# Patient Record
Sex: MALE | Race: WHITE | NOT HISPANIC OR LATINO | URBAN - METROPOLITAN AREA
[De-identification: names, ages, dates, MRNs, and addresses within clinical notes are randomized per-mention and may not be internally consistent; named-entity substitution may affect disease eponyms.]

---

## 2017-01-02 DIAGNOSIS — F33.1 MAJOR DEPRESSIVE DISORDER, RECURRENT EPISODE, MODERATE (H): Primary | ICD-10-CM

## 2017-01-03 RX ORDER — ESCITALOPRAM OXALATE 20 MG/1
TABLET ORAL
Qty: 30 TABLET | Refills: 0 | Status: SHIPPED | OUTPATIENT
Start: 2017-01-03 | End: 2017-03-02

## 2017-01-03 RX ORDER — BUPROPION HYDROCHLORIDE 150 MG/1
TABLET ORAL
Qty: 30 TABLET | Refills: 0 | Status: SHIPPED | OUTPATIENT
Start: 2017-01-03 | End: 2017-03-02

## 2017-01-03 NOTE — TELEPHONE ENCOUNTER
Kian he said he would call back to schedule an appointment.  I did let him know that a failure to schedule an appointment may result in no further refills of his/her medication.

## 2017-01-03 NOTE — TELEPHONE ENCOUNTER
Please let the patient know that a 30 day refill has been sent for their prescription.  They are due for a return fasting COMPLETE PHYSICAL within 30 days.  Failure to schedule an appointment may result in no further refills of his/her medication.

## 2017-01-03 NOTE — TELEPHONE ENCOUNTER
Andrey Parks is requesting a refill of:    Pending Prescriptions:                       Disp   Refills    escitalopram (LEXAPRO) 20 MG tablet [Phar*30 tab*0            Sig: TAKE 1 TABLET BY MOUTH DAILY    buPROPion (WELLBUTRIN XL) 150 MG 24 hr ta*30 tab*0            Sig: TAKE 1 TABLET BY MOUTH EVERY MORNING    Pt last seen 11/20/15. Please adivse

## 2017-03-02 ENCOUNTER — OFFICE VISIT (OUTPATIENT)
Dept: FAMILY MEDICINE | Facility: CLINIC | Age: 47
End: 2017-03-02

## 2017-03-02 VITALS
HEART RATE: 58 BPM | TEMPERATURE: 98.2 F | DIASTOLIC BLOOD PRESSURE: 80 MMHG | HEIGHT: 68 IN | BODY MASS INDEX: 31.25 KG/M2 | WEIGHT: 206.2 LBS | SYSTOLIC BLOOD PRESSURE: 122 MMHG | OXYGEN SATURATION: 98 %

## 2017-03-02 DIAGNOSIS — D49.2 SKIN NEOPLASM: ICD-10-CM

## 2017-03-02 DIAGNOSIS — Z87.891 PERSONAL HISTORY OF TOBACCO USE, PRESENTING HAZARDS TO HEALTH: ICD-10-CM

## 2017-03-02 DIAGNOSIS — F33.1 MAJOR DEPRESSIVE DISORDER, RECURRENT EPISODE, MODERATE (H): ICD-10-CM

## 2017-03-02 DIAGNOSIS — G47.33 OSA (OBSTRUCTIVE SLEEP APNEA): ICD-10-CM
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
## 2017-03-02 DIAGNOSIS — Z00.00 ROUTINE HISTORY AND PHYSICAL EXAMINATION OF ADULT: Primary | ICD-10-CM

## 2017-03-02 LAB
% GRANULOCYTES: 43.4 %
HCT VFR BLD AUTO: 47.6 % (ref 40–53)
HEMOGLOBIN: 15.9 G/DL (ref 13.3–17.7)
LYMPHOCYTES NFR BLD AUTO: 47.7 %
MCH RBC QN AUTO: 31.5 PG (ref 26–33)
MCHC RBC AUTO-ENTMCNC: 33.4 G/DL (ref 31–36)
MCV RBC AUTO: 94.4 FL (ref 78–100)
MONOCYTES NFR BLD AUTO: 8.9 %
PLATELET COUNT - QUEST: 210 10^9/L (ref 150–375)
RBC # BLD AUTO: 5.04 10*12/L (ref 4.4–5.9)
WBC # BLD AUTO: 5.3 10*9/L (ref 4–11)

## 2017-03-02 PROCEDURE — 80048 BASIC METABOLIC PNL TOTAL CA: CPT | Mod: 90 | Performed by: PHYSICIAN ASSISTANT

## 2017-03-02 PROCEDURE — 85025 COMPLETE CBC W/AUTO DIFF WBC: CPT | Performed by: PHYSICIAN ASSISTANT

## 2017-03-02 PROCEDURE — 80076 HEPATIC FUNCTION PANEL: CPT | Mod: 90 | Performed by: PHYSICIAN ASSISTANT

## 2017-03-02 PROCEDURE — 99396 PREV VISIT EST AGE 40-64: CPT | Performed by: PHYSICIAN ASSISTANT

## 2017-03-02 PROCEDURE — 36415 COLL VENOUS BLD VENIPUNCTURE: CPT | Performed by: PHYSICIAN ASSISTANT

## 2017-03-02 PROCEDURE — 80061 LIPID PANEL: CPT | Mod: 90 | Performed by: PHYSICIAN ASSISTANT

## 2017-03-02 RX ORDER — ESCITALOPRAM OXALATE 20 MG/1
20 TABLET ORAL DAILY
Qty: 90 TABLET | Refills: 3 | Status: SHIPPED | OUTPATIENT
Start: 2017-03-02 | End: 2018-06-28

## 2017-03-02 RX ORDER — BUPROPION HYDROCHLORIDE 150 MG/1
150 TABLET ORAL EVERY MORNING
Qty: 90 TABLET | Refills: 3 | Status: SHIPPED | OUTPATIENT
Start: 2017-03-02 | End: 2018-06-28

## 2017-03-02 ASSESSMENT — PATIENT HEALTH QUESTIONNAIRE - PHQ9: 5. POOR APPETITE OR OVEREATING: SEVERAL DAYS

## 2017-03-02 ASSESSMENT — ANXIETY QUESTIONNAIRES
1. FEELING NERVOUS, ANXIOUS, OR ON EDGE: SEVERAL DAYS
IF YOU CHECKED OFF ANY PROBLEMS ON THIS QUESTIONNAIRE, HOW DIFFICULT HAVE THESE PROBLEMS MADE IT FOR YOU TO DO YOUR WORK, TAKE CARE OF THINGS AT HOME, OR GET ALONG WITH OTHER PEOPLE: NOT DIFFICULT AT ALL
7. FEELING AFRAID AS IF SOMETHING AWFUL MIGHT HAPPEN: NOT AT ALL
6. BECOMING EASILY ANNOYED OR IRRITABLE: NOT AT ALL
3. WORRYING TOO MUCH ABOUT DIFFERENT THINGS: SEVERAL DAYS
2. NOT BEING ABLE TO STOP OR CONTROL WORRYING: SEVERAL DAYS
GAD7 TOTAL SCORE: 5
5. BEING SO RESTLESS THAT IT IS HARD TO SIT STILL: SEVERAL DAYS

## 2017-03-02 NOTE — PROGRESS NOTES
SUBJECTIVE:     CC: Andrey Parks is an 46 year old male who presents for preventative health visit.     Healthy Habits:    Do you get at least three servings of calcium containing foods daily (dairy, green leafy vegetables, etc.)? yes    Amount of exercise or daily activities, outside of work: Cross Fit    Problems taking medications regularly No    Medication side effects: No    Have you had an eye exam in the past two years? no    Do you see a dentist twice per year? yes    Do you have sleep apnea, excessive snoring or daytime drowsiness?yes    1. Depression well controlled.   Denies AE      Today's PHQ-2 Score: No flowsheet data found.      Social History   Substance Use Topics     Smoking status: Former Smoker     Packs/day: 0.75     Years: 20.00     Quit date: 6/3/2010     Smokeless tobacco: Current User     Types: Chew      Comment: quit chewing     Alcohol use 2.4 oz/week     4 Standard drinks or equivalent per week     The patient does not drink >3 drinks per day nor >7 drinks per week.    Last PSA: No results found for: PSA    Recent Labs   Lab Test  11/20/15   1112  06/03/14   1349   CHOL  210*  183   HDL  52  61   LDL  138*  106   TRIG  100  82   CHOLHDLRATIO  4.0  3.0       Reviewed orders with patient. Reviewed health maintenance and updated orders accordingly - Yes    Reviewed and updated as needed this visit by clinical staff  Tobacco  Allergies  Meds  Problems         Reviewed and updated as needed this visit by Provider  Allergies  Meds  Problems        Past Medical History   Diagnosis Date     Anxiety      Chronic infection      Lymes disease 2011, 1997     Depressive disorder      Lyme disease 2012     hearing loss right ear     Onychomycosis      left toe - treated with PO Lamisil      Past Surgical History   Procedure Laterality Date     Orthopedic surgery       right ankle surgery     Arthroscopy knee       right knee     Tonsillectomy       childhood     Septoplasty, turbinoplasty,  combined  11/2/2011     Procedure:COMBINED SEPTOPLASTY, TURBINOPLASTY; COMBINED SEPTOPLASTY  with Turbinate Reduction ; Surgeon:JACQUELINE KIRAN; Location:[facility] OR     Colonoscopy  2013     normal     Refractive surgery  2010     Lasix       ROS:  C: NEGATIVE for fever, chills, change in weight  I: NEGATIVE for worrisome rashes, moles or lesions  E: NEGATIVE for vision changes or irritation  ENT: NEGATIVE for ear, mouth and throat problems  R: NEGATIVE for significant cough or SOB  CV: NEGATIVE for chest pain, palpitations or peripheral edema  GI: NEGATIVE for nausea, abdominal pain,  or change in bowel habits - did have some GERD last week   male: negative for dysuria, hematuria, decreased urinary stream, erectile dysfunction, urethral discharge  M: NEGATIVE for significant arthralgias or myalgia  N: NEGATIVE for weakness, dizziness or paresthesias  P: NEGATIVE for changes in mood or affect    Problem list, Medication list, Allergies, and Medical/Social/Surgical histories reviewed in [facility] and updated as appropriate.  Labs reviewed in EPIC  BP Readings from Last 3 Encounters:   03/02/17 122/80   11/20/15 114/78   06/03/14 112/70    Wt Readings from Last 3 Encounters:   03/02/17 93.5 kg (206 lb 3.2 oz)   11/20/15 89.8 kg (198 lb)   06/03/14 86.9 kg (191 lb 9.6 oz)                  Patient Active Problem List   Diagnosis     Health Care Home     ACP (advance care planning)     Major depressive disorder, recurrent episode, moderate (H)     DANY (obstructive sleep apnea)     Skin neoplasm - upper left back 3 mm erythematous macule 2017     Past Surgical History   Procedure Laterality Date     Orthopedic surgery       right ankle surgery     Arthroscopy knee       right knee     Tonsillectomy       childhood     Septoplasty, turbinoplasty, combined  11/2/2011     Procedure:COMBINED SEPTOPLASTY, TURBINOPLASTY; COMBINED SEPTOPLASTY  with Turbinate Reduction ; Surgeon:JACQUELINE KIRAN; Location:[facility]     Colonoscopy  2013  "    normal     Refractive surgery  2010     Lasix       Social History   Substance Use Topics     Smoking status: Former Smoker     Packs/day: 0.75     Years: 20.00     Quit date: 6/3/2010     Smokeless tobacco: Current User     Types: Chew      Comment: quit chewing     Alcohol use 2.4 oz/week     4 Standard drinks or equivalent per week     Family History   Problem Relation Age of Onset     CANCER Mother 70     possible colon or ovarian that metastasized     Alcohol/Drug Mother      Depression Mother      Alcohol/Drug Father      Seizure Disorder Father      Depression Brother      MENTAL ILLNESS Brother      ADHD     Depression Brother      Other - See Comments Son      autism spectrum     CANCER Paternal Grandfather      prostate         Current Outpatient Prescriptions   Medication Sig Dispense Refill     escitalopram (LEXAPRO) 20 MG tablet Take 1 tablet (20 mg) by mouth daily 90 tablet 3     buPROPion (WELLBUTRIN XL) 150 MG 24 hr tablet Take 1 tablet (150 mg) by mouth every morning 90 tablet 3     Multiple Vitamin (MULTI-DAY) TABS Take 1 tablet by mouth daily.       FISH OIL Take 2,000 mg by mouth daily.       [DISCONTINUED] escitalopram (LEXAPRO) 20 MG tablet TAKE 1 TABLET BY MOUTH DAILY 30 tablet 0     [DISCONTINUED] buPROPion (WELLBUTRIN XL) 150 MG 24 hr tablet TAKE 1 TABLET BY MOUTH EVERY MORNING 30 tablet 0     Allergies   Allergen Reactions     Aspirin [Dihydroxyaluminum Aminoacetate] Rash     OBJECTIVE:     /80 (BP Location: Right arm, Patient Position: Chair, Cuff Size: Adult Regular)  Pulse 58  Temp 98.2  F (36.8  C) (Oral)  Ht 1.715 m (5' 7.5\")  Wt 93.5 kg (206 lb 3.2 oz)  SpO2 98%  BMI 31.82 kg/m2  EXAM:  GENERAL: healthy, alert and no distress  EYES: Eyes grossly normal to inspection, PERRL and conjunctivae and sclerae normal  HENT: ear canals and TM's normal, nose and mouth without ulcers or lesions  NECK: no adenopathy, no asymmetry, masses, or scars and thyroid normal to palpation  RESP: " lungs clear to auscultation - no rales, rhonchi or wheezes  CV: regular rate and rhythm, normal S1 S2, no S3 or S4, no murmur, click or rub, no peripheral edema and peripheral pulses strong  ABDOMEN: soft, nontender, no hepatosplenomegaly, no masses and bowel sounds normal   (male): normal male genitalia without lesions or urethral discharge, no hernia  MS: no gross musculoskeletal defects noted, no edema  SKIN: upper left back there is a 3 mm erythematous macule.  NEURO: Normal strength and tone, mentation intact and speech normal  PSYCH: mentation appears normal, affect normal/bright    ASSESSMENT/PLAN:     1. Major depressive disorder, recurrent episode, moderate (H)  - escitalopram (LEXAPRO) 20 MG tablet; Take 1 tablet (20 mg) by mouth daily  Dispense: 90 tablet; Refill: 3  - buPROPion (WELLBUTRIN XL) 150 MG 24 hr tablet; Take 1 tablet (150 mg) by mouth every morning  Dispense: 90 tablet; Refill: 3    2. DANY (obstructive sleep apnea)  Continue machine    3. Routine history and physical examination of adult    - Lipid Profile (QUEST)  - BASIC METABOLIC PANEL (QUEST)  - HEMOGRAM/PLATE/DIFF    4. Personal history of tobacco use, presenting hazards to health  Great job quitting chewing tobacco.  Stop smoking cigarettes advised.     5. Skin neoplasm - upper left back 3 mm erythematous macule 2017  Obs for now, may need bx if changing or enlarging    6. Exposure to smoke, fire and flames, sequela  [facility] dept - requests these labs annualy  - HEPATIC PANEL (QUEST)  - BASIC METABOLIC PANEL (QUEST)  - HEMOGRAM/PLATE/DIFF    COUNSELING:  Special attention given to:        Regular exercise       Healthy diet/nutrition       Vision screening    BP Screening:   Last 3 BP Readings:    BP Readings from Last 3 Encounters:   03/02/17 122/80   11/20/15 114/78   06/03/14 112/70       The following was recommended to the patient:  Re-screen BP within a year and recommended lifestyle modifications  BP Readings from Last 6  "Encounters:   03/02/17 122/80   11/20/15 114/78   06/03/14 112/70   07/29/13 118/79          reports that he quit smoking about 6 years ago. He has a 15.00 pack-year smoking history. His smokeless tobacco use includes Chew.    Estimated body mass index is 31.82 kg/(m^2) as calculated from the following:    Height as of this encounter: 1.715 m (5' 7.5\").    Weight as of this encounter: 93.5 kg (206 lb 3.2 oz).   Weight management plan: Discussed healthy diet and exercise guidelines and patient will follow up in 12 months in clinic to re-evaluate.    Counseling Resources:  ATP IV Guidelines  Pooled Cohorts Equation Calculator  FRAX Risk Assessment  ICSI Preventive Guidelines  Dietary Guidelines for Americans, 2010  USDA's MyPlate  ASA Prophylaxis  Lung CA Screening    SOTO Schumacher  [facility]      "

## 2017-03-02 NOTE — MR AVS SNAPSHOT
After Visit Summary   3/2/2017    Andrey Parks    MRN: [id number]           Patient Information     Date Of Birth          1970        Visit Information        Provider Department      3/2/2017 10:30 AM Bethany Monge PA [facility]        Today's Diagnoses     Routine history and physical examination of adult    -  1    Major depressive disorder, recurrent episode, moderate (H)        DANY (obstructive sleep apnea)        Personal history of tobacco use, presenting hazards to health        Skin neoplasm - upper left back 3 mm erythematous macule 2017        Exposure to smoke, fire and flames, sequela           Follow-ups after your visit        Who to contact     If you have questions or need follow up information about today's clinic visit or your schedule please contact [facility] directly at [phone].  Normal or non-critical lab and imaging results will be communicated to you by [email or web address], letter or phone within 4 business days after the clinic has received the results. If you do not hear from us within 7 days, please contact the clinic through [email or web address] or phone. If you have a critical or abnormal lab result, we will notify you by phone as soon as possible.  Submit refill requests through iKONVER[employer] or call your pharmacy and they will forward the refill request to us. Please allow 3 business days for your refill to be completed.          Additional Information About Your Visit        MyChart Information     iKONVERSE gives you secure access to your electronic health record. If you see a primary care provider, you can also send messages to your care team and make appointments. If you have questions, please call your primary care clinic.  If you do not have a primary care provider, please call [phone] and they will assist you.        Care EveryWhere ID     This is your Care EveryWhere ID. This could be used by other organizations to  "access your [facility] medical records  [id number]        Your Vitals Were     Pulse Temperature Height Pulse Oximetry BMI (Body Mass Index)       58 98.2  F (36.8  C) (Oral) 1.715 m (5' 7.5\") 98% 31.82 kg/m2        Blood Pressure from Last 3 Encounters:   03/02/17 122/80   11/20/15 114/78   06/03/14 112/70    Weight from Last 3 Encounters:   03/02/17 93.5 kg (206 lb 3.2 oz)   11/20/15 89.8 kg (198 lb)   06/03/14 86.9 kg (191 lb 9.6 oz)              We Performed the Following     BASIC METABOLIC PANEL (QUEST)     HEMOGRAM/PLATE/DIFF     HEPATIC PANEL (QUEST)     Lipid Profile (QUEST)          Today's Medication Changes          These changes are accurate as of: 3/2/17  2:38 PM.  If you have any questions, ask your nurse or doctor.               These medicines have changed or have updated prescriptions.        Dose/Directions    buPROPion 150 MG 24 hr tablet   Commonly known as:  WELLBUTRIN XL   This may have changed:  See the new instructions.   Used for:  Major depressive disorder, recurrent episode, moderate (H)   Changed by:  Bethany Monge PA        Dose:  150 mg   Take 1 tablet (150 mg) by mouth every morning   Quantity:  90 tablet   Refills:  3       escitalopram 20 MG tablet   Commonly known as:  LEXAPRO   This may have changed:  See the new instructions.   Used for:  Major depressive disorder, recurrent episode, moderate (H)   Changed by:  Bethany Monge PA        Dose:  20 mg   Take 1 tablet (20 mg) by mouth daily   Quantity:  90 tablet   Refills:  3            Where to get your medicines      These medications were sent to [facility] [id number] [place] [address] - [address] AT [facility] [address]  [address], [facility] [address]     Phone:  [phone]     buPROPion 150 MG 24 hr tablet    escitalopram 20 MG tablet                Primary Care Provider Office Phone # Fax #    SOTO Schumacher [phone]       " [facility]  [address]  [facility] [address]        Thank you!     Thank you for choosing [facility]  for your care. Our goal is always to provide you with excellent care. Hearing back from our patients is one way we can continue to improve our services. Please take a few minutes to complete the written survey that you may receive in the mail after your visit with us. Thank you!             Your Updated Medication List - Protect others around you: Learn how to safely use, store and throw away your medicines at www.disposemymeds.org.          This list is accurate as of: 3/2/17  2:38 PM.  Always use your most recent med list.                   Brand Name Dispense Instructions for use    buPROPion 150 MG 24 hr tablet    WELLBUTRIN XL    90 tablet    Take 1 tablet (150 mg) by mouth every morning       escitalopram 20 MG tablet    LEXAPRO    90 tablet    Take 1 tablet (20 mg) by mouth daily       FISH OIL      Take 2,000 mg by mouth daily.       MULTI-DAY Tabs      Take 1 tablet by mouth daily.

## 2017-03-02 NOTE — NURSING NOTE
Andrey Parks is here for physical and is fasting     Pre-Visit Screening :  Immunizations : up to date  Colon Screening : is up to date  Asthma Action Test/Plan : NA  PHQ9/GAD7 :  Done Today

## 2017-03-03 LAB
ALBUMIN SERPL-MCNC: 4.5 G/DL (ref 3.6–5.1)
ALBUMIN/GLOB SERPL: 2.3 (CALC) (ref 1–2.5)
ALP SERPL-CCNC: 65 U/L (ref 40–115)
ALT SERPL-CCNC: 18 U/L (ref 9–46)
AST SERPL-CCNC: 33 U/L (ref 10–40)
BILIRUB SERPL-MCNC: 0.6 MG/DL (ref 0.2–1.2)
BILIRUBIN, DIRECT: 0.1 MG/DL (ref 0–0.5)
BILIRUBIN,INDIRECT: 0.5 MG/DL (CALC) (ref 0.2–1.2)
BUN SERPL-MCNC: 18 MG/DL (ref 7–25)
BUN/CREATININE RATIO: NORMAL (CALC) (ref 6–22)
CALCIUM SERPL-MCNC: 9.5 MG/DL (ref 8.6–10.3)
CHLORIDE SERPLBLD-SCNC: 106 MMOL/L (ref 98–110)
CHOLEST SERPL-MCNC: 183 MG/DL (ref 125–200)
CHOLEST/HDLC SERPL: 3.3 (CALC)
CO2 SERPL-SCNC: 26 MMOL/L (ref 20–31)
CREAT SERPL-MCNC: 1.1 MG/DL (ref 0.6–1.35)
EGFR AFRICAN AMERICAN - QUEST: 94 ML/MIN/1.73M2
GFR SERPL CREATININE-BSD FRML MDRD: 81 ML/MIN/1.73M2
GLOBULIN, CALCULATED - QUEST: 2 G/DL (CALC) (ref 1.9–3.7)
GLUCOSE - QUEST: 95 MG/DL (ref 65–99)
HDLC SERPL-MCNC: 56 MG/DL
LDLC SERPL CALC-MCNC: 106 MG/DL (CALC)
NONHDLC SERPL-MCNC: 127 MG/DL (CALC)
POTASSIUM SERPL-SCNC: 4 MMOL/L (ref 3.5–5.3)
PROT SERPL-MCNC: 6.5 G/DL (ref 6.1–8.1)
SODIUM SERPL-SCNC: 141 MMOL/L (ref 135–146)
TRIGL SERPL-MCNC: 105 MG/DL

## 2017-03-03 ASSESSMENT — ANXIETY QUESTIONNAIRES: GAD7 TOTAL SCORE: 5

## 2017-03-03 ASSESSMENT — PATIENT HEALTH QUESTIONNAIRE - PHQ9: SUM OF ALL RESPONSES TO PHQ QUESTIONS 1-9: 0

## 2018-06-28 DIAGNOSIS — F33.1 MAJOR DEPRESSIVE DISORDER, RECURRENT EPISODE, MODERATE (H): ICD-10-CM

## 2018-06-28 RX ORDER — BUPROPION HYDROCHLORIDE 150 MG/1
TABLET ORAL
Qty: 90 TABLET | Refills: 0 | OUTPATIENT
Start: 2018-06-28

## 2018-06-28 RX ORDER — ESCITALOPRAM OXALATE 20 MG/1
20 TABLET ORAL DAILY
Qty: 30 TABLET | Refills: 0 | COMMUNITY
Start: 2018-06-28 | End: 2018-07-30

## 2018-06-28 RX ORDER — BUPROPION HYDROCHLORIDE 150 MG/1
150 TABLET ORAL EVERY MORNING
Qty: 30 TABLET | Refills: 0 | COMMUNITY
Start: 2018-06-28 | End: 2018-07-30

## 2018-06-28 RX ORDER — ESCITALOPRAM OXALATE 20 MG/1
TABLET ORAL
Qty: 90 TABLET | Refills: 0 | OUTPATIENT
Start: 2018-06-28

## 2018-06-28 NOTE — TELEPHONE ENCOUNTER
Andrey is calling stating he needs a temporary fill of is meds until his appt on 7/17.  I called a 30 day refill of Lexapro and Wellbutrin to [facility] pharmacy to get him though and spoke to Andrey to let him know.

## 2018-06-28 NOTE — TELEPHONE ENCOUNTER
Refused Prescriptions:                       Disp   Refills    buPROPion (WELLBUTRIN XL) 150 MG 24 hr tab*90 tab*0        Sig: TAKE 1 TABLET BY MOUTH EVERY MORNING  Refused By: ISMA MERAZ  Reason for Refusal: Appt required, please call patient    escitalopram (LEXAPRO) 20 MG tablet [Pharm*90 tab*0        Sig: TAKE 1 TABLET BY MOUTH EVERY DAY  Refused By: ISMA MERAZ  Reason for Refusal: Appt required, please call patient    Huey  [phone] (home) [phone] (work)

## 2018-07-30 ENCOUNTER — OFFICE VISIT (OUTPATIENT)
Dept: FAMILY MEDICINE | Facility: CLINIC | Age: 48
End: 2018-07-30

## 2018-07-30 VITALS
HEART RATE: 68 BPM | HEIGHT: 67 IN | OXYGEN SATURATION: 97 % | BODY MASS INDEX: 32.65 KG/M2 | WEIGHT: 208 LBS | TEMPERATURE: 97.9 F | SYSTOLIC BLOOD PRESSURE: 120 MMHG | DIASTOLIC BLOOD PRESSURE: 76 MMHG

## 2018-07-30 DIAGNOSIS — D49.2 SKIN NEOPLASM: ICD-10-CM

## 2018-07-30 DIAGNOSIS — Z87.891 PERSONAL HISTORY OF TOBACCO USE, PRESENTING HAZARDS TO HEALTH: ICD-10-CM

## 2018-07-30 DIAGNOSIS — F33.1 MAJOR DEPRESSIVE DISORDER, RECURRENT EPISODE, MODERATE (H): ICD-10-CM

## 2018-07-30 DIAGNOSIS — E66.9 OBESITY (BMI 30-39.9): ICD-10-CM

## 2018-07-30 DIAGNOSIS — G47.33 OSA (OBSTRUCTIVE SLEEP APNEA): ICD-10-CM

## 2018-07-30 DIAGNOSIS — Z02.89 ENCOUNTER FOR FIREFIGHTER HEALTH EXAMINATION: ICD-10-CM

## 2018-07-30 DIAGNOSIS — Z00.00 ENCOUNTER FOR ROUTINE HISTORY AND PHYSICAL EXAM FOR MALE: Primary | ICD-10-CM

## 2018-07-30 LAB
% GRANULOCYTES: 50.9 %
ALBUMIN (URINE): ABNORMAL MG/DL
APPEARANCE UR: CLEAR
BACTERIA, UR: ABNORMAL
BILIRUB UR QL: ABNORMAL
CASTS/LPF: ABNORMAL
COLOR UR: YELLOW
EP/HPF: ABNORMAL
GLUCOSE URINE: ABNORMAL MG/DL
HCT VFR BLD AUTO: 46.3 % (ref 40–53)
HEMOGLOBIN: 15.8 G/DL (ref 13.3–17.7)
HGB UR QL: ABNORMAL
KETONES UR QL: ABNORMAL MG/DL
LEUKOCYTE ESTERASE - QUEST: ABNORMAL
LYMPHOCYTES NFR BLD AUTO: 38.4 %
MCH RBC QN AUTO: 32.6 PG (ref 26–33)
MCHC RBC AUTO-ENTMCNC: 34.1 G/DL (ref 31–36)
MCV RBC AUTO: 95.6 FL (ref 78–100)
MISC.: ABNORMAL
MONOCYTES NFR BLD AUTO: 10.7 %
NITRITE UR QL STRIP: ABNORMAL
PH UR STRIP: 7.5 PH (ref 5–7)
PLATELET COUNT - QUEST: 218 10^9/L (ref 150–375)
RBC # BLD AUTO: 4.84 10*12/L (ref 4.4–5.9)
RBC, UR MICRO: ABNORMAL (ref ?–2)
SP. GRAVITY: 1.02
UROBILINOGEN UR QL STRIP: 0.2 EU/DL (ref 0.2–1)
WBC # BLD AUTO: 4.6 10*9/L (ref 4–11)
WBC, UR MICRO: ABNORMAL (ref ?–2)

## 2018-07-30 PROCEDURE — 99396 PREV VISIT EST AGE 40-64: CPT | Performed by: PHYSICIAN ASSISTANT

## 2018-07-30 PROCEDURE — 80048 BASIC METABOLIC PNL TOTAL CA: CPT | Mod: 90 | Performed by: PHYSICIAN ASSISTANT

## 2018-07-30 PROCEDURE — 85025 COMPLETE CBC W/AUTO DIFF WBC: CPT | Performed by: PHYSICIAN ASSISTANT

## 2018-07-30 PROCEDURE — 36415 COLL VENOUS BLD VENIPUNCTURE: CPT | Performed by: PHYSICIAN ASSISTANT

## 2018-07-30 PROCEDURE — 93000 ELECTROCARDIOGRAM COMPLETE: CPT | Performed by: PHYSICIAN ASSISTANT

## 2018-07-30 PROCEDURE — 80076 HEPATIC FUNCTION PANEL: CPT | Mod: 90 | Performed by: PHYSICIAN ASSISTANT

## 2018-07-30 PROCEDURE — 80061 LIPID PANEL: CPT | Mod: 90 | Performed by: PHYSICIAN ASSISTANT

## 2018-07-30 PROCEDURE — 81001 URINALYSIS AUTO W/SCOPE: CPT | Performed by: PHYSICIAN ASSISTANT

## 2018-07-30 RX ORDER — ESCITALOPRAM OXALATE 20 MG/1
20 TABLET ORAL DAILY
Qty: 90 TABLET | Refills: 3 | Status: SHIPPED | OUTPATIENT
Start: 2018-07-30 | End: 2019-08-17

## 2018-07-30 RX ORDER — BUPROPION HYDROCHLORIDE 150 MG/1
150 TABLET ORAL EVERY MORNING
Qty: 90 TABLET | Refills: 3 | Status: SHIPPED | OUTPATIENT
Start: 2018-07-30 | End: 2019-08-17

## 2018-07-30 ASSESSMENT — ANXIETY QUESTIONNAIRES
7. FEELING AFRAID AS IF SOMETHING AWFUL MIGHT HAPPEN: NOT AT ALL
3. WORRYING TOO MUCH ABOUT DIFFERENT THINGS: NOT AT ALL
6. BECOMING EASILY ANNOYED OR IRRITABLE: SEVERAL DAYS
5. BEING SO RESTLESS THAT IT IS HARD TO SIT STILL: NOT AT ALL
IF YOU CHECKED OFF ANY PROBLEMS ON THIS QUESTIONNAIRE, HOW DIFFICULT HAVE THESE PROBLEMS MADE IT FOR YOU TO DO YOUR WORK, TAKE CARE OF THINGS AT HOME, OR GET ALONG WITH OTHER PEOPLE: NOT DIFFICULT AT ALL
1. FEELING NERVOUS, ANXIOUS, OR ON EDGE: SEVERAL DAYS
GAD7 TOTAL SCORE: 2
2. NOT BEING ABLE TO STOP OR CONTROL WORRYING: NOT AT ALL

## 2018-07-30 ASSESSMENT — PATIENT HEALTH QUESTIONNAIRE - PHQ9: 5. POOR APPETITE OR OVEREATING: NOT AT ALL

## 2018-07-30 NOTE — MR AVS SNAPSHOT
After Visit Summary   7/30/2018    Andrey Parks    MRN: [id number]           Patient Information     Date Of Birth          1970        Visit Information        Provider Department      7/30/2018 9:30 AM Bethany Monge PA [facility]        Today's Diagnoses     Encounter for routine history and physical exam for male    -  1    DANY (obstructive sleep apnea)        Major depressive disorder, recurrent episode, moderate (H)        Skin neoplasm - upper left back 3 mm erythematous macule 2017        Encounter for  health examination        Obesity (BMI 30-39.9)        Personal history of tobacco use, presenting hazards to health           Follow-ups after your visit        Who to contact     If you have questions or need follow up information about today's clinic visit or your schedule please contact [facility] directly at [phone].  Normal or non-critical lab and imaging results will be communicated to you by Tagoodieshart, letter or phone within 4 business days after the clinic has received the results. If you do not hear from us within 7 days, please contact the clinic through Tagoodieshart or phone. If you have a critical or abnormal lab result, we will notify you by phone as soon as possible.  Submit refill requests through [employer] or call your pharmacy and they will forward the refill request to us. Please allow 3 business days for your refill to be completed.          Additional Information About Your Visit        MyChart Information     [employer] gives you secure access to your electronic health record. If you see a primary care provider, you can also send messages to your care team and make appointments. If you have questions, please call your primary care clinic.  If you do not have a primary care provider, please call [phone] and they will assist you.        Care EveryWhere ID     This is your Care EveryWhere ID. This  "could be used by other organizations to access your [facility] medical records  [id number]        Your Vitals Were     Pulse Temperature Height Pulse Oximetry BMI (Body Mass Index)       68 97.9  F (36.6  C) (Oral) 1.695 m (5' 6.75\") 97% 32.82 kg/m2        Blood Pressure from Last 3 Encounters:   07/30/18 120/76   03/02/17 122/80   11/20/15 114/78    Weight from Last 3 Encounters:   07/30/18 94.3 kg (208 lb)   03/02/17 93.5 kg (206 lb 3.2 oz)   11/20/15 89.8 kg (198 lb)              We Performed the Following     BASIC METABOLIC PANEL (QUEST)     CL AFF HEMOGRAM/PLATE/DIFF ([facility])     EKG 12-lead complete w/read - Clinics     HCL  Urinalysis, Routine ([facility])     HEPATIC PANEL (QUEST)     Lipid Profile     VENOUS COLLECTION          Where to get your medicines      These medications were sent to [facility] [address] [place] [address] [address] AT [place] [address] [address]  [address], [facility] [address]     Phone:  [phone]     buPROPion 150 MG 24 hr tablet    escitalopram 20 MG tablet          Primary Care Provider Office Phone # Fax #    SOTO Schumacher [phone] [address]  [facility] [address]        Equal Access to Services     QUE JOSEPH AH: Hadii amos zhuo Sojazmyne, waaxda luqadaha, qaybta kaalmada ademargaritayada, leah hancock. So [facility] [phone].    ATENCIÓN: Si habla español, tiene a marquez disposición servicios gratuitos de asistencia lingüística. Maisha anthony [phone].    We comply with applicable federal civil rights laws and [place] laws. We do not discriminate on the basis of race, color, national origin, age, disability, sex, sexual orientation, or gender identity.            Thank you!     Thank you for choosing [facility]  for your care. Our goal is always to provide you with excellent care. Hearing back from our patients is one way we can continue to improve our " services. Please take a few minutes to complete the written survey that you may receive in the mail after your visit with us. Thank you!             Your Updated Medication List - Protect others around you: Learn how to safely use, store and throw away your medicines at www.disposemymeds.org.          This list is accurate as of 7/30/18  4:35 PM.  Always use your most recent med list.                   Brand Name Dispense Instructions for use Diagnosis    buPROPion 150 MG 24 hr tablet    WELLBUTRIN XL    90 tablet    Take 1 tablet (150 mg) by mouth every morning    Major depressive disorder, recurrent episode, moderate (H)       escitalopram 20 MG tablet    LEXAPRO    90 tablet    Take 1 tablet (20 mg) by mouth daily    Major depressive disorder, recurrent episode, moderate (H)       FISH OIL      Take 2,000 mg by mouth daily.        MULTI-DAY Tabs      Take 1 tablet by mouth daily.

## 2018-07-30 NOTE — NURSING NOTE
Patient is here for a full physical exam.    Pre-Visit Screening :  Immunizations : up to date  Colon Screening : NA  Mammogram: NA  Asthma Action Test/Plan : No concerns  PHQ9 :  PHQ-9 given today   GAD7 :  BARBARA-7 given today   Patient's  BMI Body mass index is 32.82 kg/(m^2).  Questioned patient about current smoking habits.  Pt. smokes 3-4 cigerettes a day  OK to leave a detailed voice message regarding today's visit Yes, phone # [phone]      ETOH screening:  Questions:  1-How often do you have a drink containing alcohol?                             3 times per week(s)  2-How many drinks containing alcohol do you have on a typical day when you are         Drinking?                              1 and 2   3- How often do you have 5 or more drinks on one occasion?                              Never

## 2018-07-30 NOTE — PROGRESS NOTES
SUBJECTIVE:     CC: Andrey Parks is an 47 year old male who presents for preventative health visit.     Healthy Habits:      Amount of exercise or daily activities, outside of work: 11-12 x     Problems taking medications regularly No    Medication side effects: No    Have you had an eye exam in the past two years? yes    Do you see a dentist twice per year? yes    Do you have sleep apnea, excessive snoring or daytime drowsiness? yes - CPAP used - old and needs updating but he doesn't want to go back for new sleep study    PATIENT IS  AND NEEDS ANNUAL LABS/EKG/FOB - SEE SCANNED DOC FOR ITEMS NEEDED IN MEDIA     STILL SMOKING - 2 CIGS A DAY  WIFE IS SMOKER    Wt Readings from Last 5 Encounters:   07/30/18 94.3 kg (208 lb)   03/02/17 93.5 kg (206 lb 3.2 oz)   11/20/15 89.8 kg (198 lb)   06/03/14 86.9 kg (191 lb 9.6 oz)   07/29/13 86.2 kg (190 lb)         Body mass index is 32.82 kg/(m^2).          Depression Followup    Status since last visit: Stable     See PHQ-9 for current symptoms.  Other associated symptoms: None    Complicating factors:   Significant life event:  No   Current substance abuse:  None  Anxiety or Panic symptoms:  No    PHQ-9 11/20/2015 3/2/2017   Total Score 4 0   Q9: Suicide Ideation Not at all Not at all     In the past two weeks have you had thoughts of suicide or self-harm?  No.    Do you have concerns about your personal safety or the safety of others?   No  PHQ-9  English  PHQ-9   Any Language  Suicide Assessment Five-step Evaluation and Treatment (SAFE-T)    Today's PHQ-2 Score: No flowsheet data found.    Abuse: Current or Past(Physical, Sexual or Emotional)- No  Do you feel safe in your environment - Yes    Social History   Substance Use Topics     Smoking status: Current Every Day Smoker     Packs/day: 0.75     Years: 20.00     Last attempt to quit: 6/3/2010     Smokeless tobacco: Current User     Types: Chew      Comment: quit chewing     Alcohol use 2.4 oz/week     4  Standard drinks or equivalent per week     The patient does not drink >3 drinks per day nor >7 drinks per week.    Last PSA: No results found for: PSA    Recent Labs   Lab Test  03/02/17   1130  11/20/15   1112   CHOL  183  210*   HDL  56  52   LDL  106  138*   TRIG  105  100   CHOLHDLRATIO  3.3  4.0       Reviewed orders with patient. Reviewed health maintenance and updated orders accordingly - Yes    All Histories reviewed and updated in Epic.  Past Medical History:   Diagnosis Date     Anxiety      Chronic infection     Lymes disease 2011, 1997     Depressive disorder      Lyme disease 2012    hearing loss right ear     Onychomycosis     left toe - treated with PO Lamisil      Past Surgical History:   Procedure Laterality Date     ARTHROSCOPY KNEE      right knee     COLONOSCOPY  2013    normal     ORTHOPEDIC SURGERY      right ankle surgery     refractive surgery  2010    Lasix     SEPTOPLASTY, TURBINOPLASTY, COMBINED  11/2/2011    Procedure:COMBINED SEPTOPLASTY, TURBINOPLASTY; COMBINED SEPTOPLASTY  with Turbinate Reduction ; Surgeon:JACQUELINE KIRAN; Location:[facility] OR     TONSILLECTOMY      childhood       ROS:  C: NEGATIVE for fever, chills, change in weight  I: NEGATIVE for worrisome rashes, moles or lesions  E: NEGATIVE for vision changes or irritation  ENT: NEGATIVE for ear, mouth and throat problems  R: NEGATIVE for significant cough or SOB  CV: NEGATIVE for chest pain, palpitations or peripheral edema  GI: NEGATIVE for nausea, abdominal pain, heartburn, or change in bowel habits - has occ. Constipation if not eating well.    male: negative for dysuria, hematuria, decreased urinary stream, erectile dysfunction, urethral discharge  M: NEGATIVE for significant arthralgias or myalgia  N: NEGATIVE for weakness, dizziness or paresthesias  P: NEGATIVE for changes in mood or affect    Problem list, Medication list, Allergies, and Medical/Social/Surgical histories reviewed in [facility] and updated as appropriate.  Labs  reviewed in EPIC  BP Readings from Last 3 Encounters:   07/30/18 120/76   03/02/17 122/80   11/20/15 114/78    Wt Readings from Last 3 Encounters:   07/30/18 94.3 kg (208 lb)   03/02/17 93.5 kg (206 lb 3.2 oz)   11/20/15 89.8 kg (198 lb)                  Patient Active Problem List   Diagnosis     Health Care Home     ACP (advance care planning)     Major depressive disorder, recurrent episode, moderate (H)     DANY (obstructive sleep apnea)     Skin neoplasm - upper left back 3 mm erythematous macule 2017     Past Surgical History:   Procedure Laterality Date     ARTHROSCOPY KNEE      right knee     COLONOSCOPY  2013    normal     ORTHOPEDIC SURGERY      right ankle surgery     refractive surgery  2010    Lasix     SEPTOPLASTY, TURBINOPLASTY, COMBINED  11/2/2011    Procedure:COMBINED SEPTOPLASTY, TURBINOPLASTY; COMBINED SEPTOPLASTY  with Turbinate Reduction ; Surgeon:JACQUELINE KIRAN; Location:[facility] OR     TONSILLECTOMY      childhood       Social History   Substance Use Topics     Smoking status: Current Every Day Smoker     Packs/day: 0.75     Years: 20.00     Last attempt to quit: 6/3/2010     Smokeless tobacco: Current User     Types: Chew      Comment: quit chewing     Alcohol use 2.4 oz/week     4 Standard drinks or equivalent per week     Family History   Problem Relation Age of Onset     Cancer Mother 70     possible colon or ovarian that metastasized     Alcohol/Drug Mother      Depression Mother      Alcohol/Drug Father      Seizure Disorder Father      Depression Brother      Mental Illness Brother      ADHD     Depression Brother      Other - See Comments Son      autism spectrum     Cancer Paternal Grandfather      prostate         Current Outpatient Prescriptions   Medication Sig Dispense Refill     buPROPion (WELLBUTRIN XL) 150 MG 24 hr tablet Take 1 tablet (150 mg) by mouth every morning 30 tablet 0     escitalopram (LEXAPRO) 20 MG tablet Take 1 tablet (20 mg) by mouth daily 30 tablet 0     FISH OIL  "Take 2,000 mg by mouth daily.       Multiple Vitamin (MULTI-DAY) TABS Take 1 tablet by mouth daily.       Allergies   Allergen Reactions     Aspirin [Dihydroxyaluminum Aminoacetate] Rash     Recent Labs   Lab Test  03/02/17   1130  11/20/15   1112  06/03/14   1349   06/26/11   2315   LDL  106  138*  106   < >   --    HDL  56  52  61   < >   --    TRIG  105  100  82   < >   --    ALT  18  18  19   < >   --    CR  1.10  1.33  1.25   --   1.01   GFRESTIMATED  81  65  71   --   83   GFRESTBLACK   --    --    --    --   >90   POTASSIUM  4.0  4.3  4.3   --   3.9    < > = values in this interval not displayed.      OBJECTIVE:     /76 (BP Location: Left arm, Patient Position: Sitting, Cuff Size: Adult Large)  Pulse 68  Temp 97.9  F (36.6  C) (Oral)  Ht 1.695 m (5' 6.75\")  Wt 94.3 kg (208 lb)  SpO2 97%  BMI 32.82 kg/m2  EXAM:  GENERAL: healthy, alert and no distress  EYES: Eyes grossly normal to inspection, PERRL and conjunctivae and sclerae normal  HENT: ear canals and TM's normal, nose and mouth without ulcers or lesions  NECK: no adenopathy, no asymmetry, masses, or scars and thyroid normal to palpation  RESP: lungs clear to auscultation - no rales, rhonchi or wheezes  CV: regular rate and rhythm, normal S1 S2, no S3 or S4, no murmur, click or rub  ABDOMEN: soft, nontender, no hepatosplenomegaly, no masses and bowel sounds normal   (male): normal male genitalia without lesions or urethral discharge, no hernia  MS: no gross musculoskeletal defects noted, no edema  SKIN: 3mm erythematous macule upper left back (unchanged from last year)  NEURO: Normal strength and tone, mentation intact and speech normal    Mental Status Exam:   Appearance: calm  Grooming: adequately groomed  Demeanor: engaged, cooperative  Affect: normal  Speech: Normal.  Gait:Normal.  Movements: Normal  Form of thought: Logical, Linear and Goal directed  Thought content:  Normal  Insight:Good   Judgment: Good   Cognition: Good "         ASSESSMENT/PLAN:     (Z00.00) Encounter for routine history and physical exam for male  (primary encounter diagnosis)  Comment: annual  exam  Plan: HCL  Urinalysis, Routine (B[facility]), EKG 12-lead         complete w/read - Clinics, VENOUS COLLECTION,         Lipid Profile, HEPATIC PANEL ([facility]), BASIC         METABOLIC PANEL ([facility]), CL AFF         HEMOGRAM/PLATE/DIFF (BFP), CANCELED:         Comprehensive metabolic panel            (G47.33) DANY (obstructive sleep apnea)  Comment: controlled  Plan: due for retesting - pt hesitant to schedule due to time/cost    (F33.1) Major depressive disorder, recurrent episode, moderate (H)  Comment: controlled  Plan: buPROPion (WELLBUTRIN XL) 150 MG 24 hr tablet,         escitalopram (LEXAPRO) 20 MG tablet            (D49.2) Skin neoplasm - upper left back 3 mm erythematous macule 2017  Comment: stable  Plan: observation    (Z02.89) Encounter for  health examination  Comment: annual  Plan: HCL  Urinalysis, Routine (BFP), EKG 12-lead         complete w/read - Clinics, VENOUS COLLECTION,         Lipid Profile, HEPATIC PANEL ([facility]), BASIC         METABOLIC PANEL ([facility]), CL AFF         HEMOGRAM/PLATE/DIFF (BFP)            (E66.9) Obesity (BMI 30-39.9)  Comment: stable  Plan: diet/exercise      Smoking  Advised cessation      COUNSELING:   Special attention given to:        Regular exercise       Healthy diet/nutrition       Vision screening      Blood Pressure:   BP Readings from Last 6 Encounters:   07/30/18 120/76   03/02/17 122/80   11/20/15 114/78   06/03/14 112/70   07/29/13 118/79         BP Screening:   Last 3 BP Readings:    BP Readings from Last 3 Encounters:   07/30/18 120/76   03/02/17 122/80   11/20/15 114/78       The following was recommended to the patient:  Re-screen BP within a year and recommended lifestyle modifications     reports that he has been smoking.  He has a 15.00 pack-year smoking history. His smokeless tobacco use includes  "Chew.  Tobacco Cessation Action Plan: Information offered: Patient not interested at this time  Estimated body mass index is 32.82 kg/(m^2) as calculated from the following:    Height as of this encounter: 1.695 m (5' 6.75\").    Weight as of this encounter: 94.3 kg (208 lb).   Weight management plan: Discussed healthy diet and exercise guidelines and patient will follow up in 12 months in clinic to re-evaluate.    Counseling Resources:  ATP IV Guidelines  Pooled Cohorts Equation Calculator  FRAX Risk Assessment  ICSI Preventive Guidelines  Dietary Guidelines for Americans, 2010  USDA's MyPlate  ASA Prophylaxis  Lung CA Screening    SOTO Schumacher  [facility]    "

## 2018-07-31 LAB
ALBUMIN SERPL-MCNC: 4.2 G/DL (ref 3.6–5.1)
ALBUMIN/GLOB SERPL: 1.8 (CALC) (ref 1–2.5)
ALP SERPL-CCNC: 65 U/L (ref 40–115)
ALT SERPL-CCNC: 16 U/L (ref 9–46)
AST SERPL-CCNC: 20 U/L (ref 10–40)
BILIRUB SERPL-MCNC: 0.5 MG/DL (ref 0.2–1.2)
BILIRUBIN, DIRECT: 0.1 MG/DL (ref 0–0.5)
BILIRUBIN,INDIRECT: 0.4 MG/DL (CALC) (ref 0.2–1.2)
BUN SERPL-MCNC: 19 MG/DL (ref 7–25)
BUN/CREATININE RATIO: NORMAL (CALC) (ref 6–22)
CALCIUM SERPL-MCNC: 9.6 MG/DL (ref 8.6–10.3)
CHLORIDE SERPLBLD-SCNC: 106 MMOL/L (ref 98–110)
CHOLEST SERPL-MCNC: 204 MG/DL
CHOLEST/HDLC SERPL: 4.2 (CALC)
CO2 SERPL-SCNC: 25 MMOL/L (ref 20–31)
CREAT SERPL-MCNC: 1.16 MG/DL (ref 0.6–1.35)
EGFR AFRICAN AMERICAN - QUEST: 88 ML/MIN/1.73M2
GFR SERPL CREATININE-BSD FRML MDRD: 76 ML/MIN/1.73M2
GLOBULIN, CALCULATED - QUEST: 2.3 G/DL (CALC) (ref 1.9–3.7)
GLUCOSE - QUEST: 94 MG/DL (ref 65–99)
HDLC SERPL-MCNC: 49 MG/DL
LDLC SERPL CALC-MCNC: 122 MG/DL (CALC)
NONHDLC SERPL-MCNC: 155 MG/DL (CALC)
POTASSIUM SERPL-SCNC: 4.4 MMOL/L (ref 3.5–5.3)
PROT SERPL-MCNC: 6.5 G/DL (ref 6.1–8.1)
SODIUM SERPL-SCNC: 139 MMOL/L (ref 135–146)
TRIGL SERPL-MCNC: 209 MG/DL

## 2018-07-31 ASSESSMENT — PATIENT HEALTH QUESTIONNAIRE - PHQ9: SUM OF ALL RESPONSES TO PHQ QUESTIONS 1-9: 1

## 2018-07-31 ASSESSMENT — ANXIETY QUESTIONNAIRES: GAD7 TOTAL SCORE: 2

## 2019-02-20 ENCOUNTER — APPOINTMENT (OUTPATIENT)
Dept: GENERAL RADIOLOGY | Facility: CLINIC | Age: 49
End: 2019-02-20
Attending: EMERGENCY MEDICINE
Payer: OTHER MISCELLANEOUS

## 2019-02-20 ENCOUNTER — HOSPITAL ENCOUNTER (EMERGENCY)
Facility: CLINIC | Age: 49
Discharge: HOME | End: 2019-02-20
Attending: EMERGENCY MEDICINE | Admitting: EMERGENCY MEDICINE
Payer: OTHER MISCELLANEOUS

## 2019-02-20 VITALS
TEMPERATURE: 97.9 F | SYSTOLIC BLOOD PRESSURE: 131 MMHG | HEART RATE: 69 BPM | RESPIRATION RATE: 20 BRPM | OXYGEN SATURATION: 98 % | DIASTOLIC BLOOD PRESSURE: 95 MMHG

## 2019-02-20 DIAGNOSIS — S92.424A CLOSED NONDISPLACED FRACTURE OF DISTAL PHALANX OF RIGHT GREAT TOE, INITIAL ENCOUNTER: ICD-10-CM

## 2019-02-20 PROCEDURE — 99284 EMERGENCY DEPT VISIT MOD MDM: CPT

## 2019-02-20 PROCEDURE — 73660 X-RAY EXAM OF TOE(S): CPT | Mod: RT

## 2019-02-20 PROCEDURE — 28490 TREAT BIG TOE FRACTURE: CPT | Mod: T5

## 2019-02-20 RX ORDER — BUPIVACAINE HYDROCHLORIDE 5 MG/ML
INJECTION, SOLUTION PERINEURAL
Status: DISCONTINUED
Start: 2019-02-20 | End: 2019-02-20 | Stop reason: HOSPADM

## 2019-02-20 RX ORDER — HYDROCODONE BITARTRATE AND ACETAMINOPHEN 5; 325 MG/1; MG/1
1-2 TABLET ORAL EVERY 6 HOURS PRN
Qty: 12 TABLET | Refills: 0 | Status: SHIPPED | OUTPATIENT
Start: 2019-02-20 | End: 2019-02-23

## 2019-02-20 ASSESSMENT — ENCOUNTER SYMPTOMS: WOUND: 1

## 2019-02-20 NOTE — ED PROVIDER NOTES
History     Chief Complaint:  Toe Pain    HPI   Andrey Parks is a 48 year old male who presents to the emergency department today with toe pain. Patient slipped and fell about 6 hours ago and hit his right foot on the metal piece between the wall and the carpet. He broke his big toe-nail and reports pain of the 1st - 3rd toes. He was icing the area, but noted continued pain and came to the ED. He rates his pain as 2/10, worse when he is stepping on it.     Allergies:  Aspirin [Dihydroxyaluminum Aminoacetate]     Medications:    Wellbutrin  Lexapro  Fish oil    Past Medical History:    Anxiety  Chronic infection  Depression  Lyme disease  Onychomycosis   Obesity  DANY   Skin neoplasm     Past Surgical History:    Arthroscopy knee  Colonoscopy  Orthopedic surgery  Refractive surgery  Septoplasty  Tonsillectomy     Family History:    Cancer  Alcohol/Drugs  Depression  Seizure disorder   ADHD    Social History:  The patient was accompanied to the ED by wife.  Smoking Status: Current every day  Smokeless Tobacco: Current   Alcohol Use: Yes   Marital Status:      Review of Systems   Skin: Positive for wound (right big toe).   All other systems reviewed and are negative.      Physical Exam     Patient Vitals for the past 24 hrs:   BP Temp Temp src Pulse Resp SpO2   02/20/19 0138 (!) 131/95 -- -- 69 -- --   02/20/19 0107 (!) 155/103 97.9  F (36.6  C) Temporal 88 20 98 %      Physical Exam    Constitutional:  Pleasant, age appropriate.   EYES:   Conjunctiva normal.  NECK:    Supple, no meningismus.   CV:     Regular rate and rhythm, no murmurs, rubs or gallops.       2+ DP pulses bilateral.  PULM:    Clear to auscultation bilateral.       No respiratory distress.      No wheezing, rales or stridor.  ABD:    Soft, non-tender, non-distended.  No pulsatile masses.       No rebound or guarding.  MSK:     Right foot: No gross deformity.       Edema and ecchymosis to the great toe with marked tenderness      Flexor and  extensor tendon intact      Nail intact but slightly avulsed from nail bed at distal aspect      Small linear abrasion to dorsum of great toe; no amarilys laceration      No additional bony tenderness to the foot  LYMPH:   No cervical lymphadenopathy.  NEURO:   Alert.      Right lower extremity:      Strength and sensation intact.  SKIN:    Warm, dry   PSYCH:    Mood is good and affect is appropriate.    Emergency Department Course   Imaging:  Radiology findings were communicated with the patient and family who voiced understanding of the findings.  XR Toe Right G/E 2 Views   Preliminary Result   IMPRESSION: Three views of the right great toe. Mildly displaced   fractures of the tuft and base of the distal phalanx of the great toe.      Report per radiology      Procedures:    Narrative: Procedure: Digital block       LOCATION:  Right great toe      FUNCTION:  Distally sensation, circulation, motor and tendon function are intact.      ANESTHESIA:  Digital block using bupivacaine w/o epi total of 4 mLs      Emergency Department Course:  Nursing notes and vitals reviewed.  0105: I performed an exam of the patient as documented above.   The patient was sent for a Right Toe XR while in the emergency department, results above.   0157:Findings and plan explained to the Patient. Patient discharged home with instructions regarding supportive care, medications, and reasons to return. The importance of close follow-up was reviewed. The patient was prescribed Norco.    I personally reviewed the imaging results with the Patient and answered all related questions prior to discharge.        Impression & Plan    Medical Decision Makin-year-old male presented to the ED with traumatic right great toe pain.  No evidence of neurovascular or tendon injury.  He was noted to have 2 fractures of the distal phalanx of the great toe.  He did have a small linear abrasion but no amarilys laceration.  The nail was partially avulsed but no  suggestion of underlying nailbed laceration to indicate open fracture.  Patient was placed in a hard sole shoe with limited supply of analgesics.  Patient to follow-up with orthopedic surgery as needed.  Work note provided.    Diagnosis:    ICD-10-CM    1. Closed nondisplaced fracture of distal phalanx of right great toe, initial encounter S92.424A        Disposition:  discharged to home    Discharge Medications:     Medication List      Started    HYDROcodone-acetaminophen 5-325 MG tablet  Commonly known as:  NORCO  1-2 tablets, Oral, EVERY 6 HOURS PRN            Scribe Disclosure:  Alicia GIRALDO, am serving as a scribe at 1:17 AM on 2/20/2019 to document services personally performed by Ernesto Llamas MD based on my observations and the provider's statements to me.    2/20/2019   [facility] EMERGENCY DEPARTMENT       Ernesto Llamas MD  02/20/19 0514

## 2019-02-20 NOTE — ED AVS SNAPSHOT
[facility]  [address]  [facility] [address]  Phone:  [phone]  Fax:  [phone]                                    Andrey Parsk   MRN: [id number]    Department:  [facility] Emergency [facility]   Date of Visit:  2/20/2019           After Visit Summary Signature Page    I have received my discharge instructions, and my questions have been answered. I have discussed any challenges I see with this plan with the nurse or doctor.    ..........................................................................................................................................  Patient/Patient Representative Signature      ..........................................................................................................................................  Patient Representative Print Name and Relationship to Patient    ..................................................               ................................................  Date                                   Time    ..........................................................................................................................................  Reviewed by Signature/Title    ...................................................              ..............................................  Date                                               Time          22EPIC Rev 08/18

## 2019-02-20 NOTE — ED TRIAGE NOTES
Pt to ER with c/o pain to right great toe, pt slipped at work and jammed toe ripping off partial toenail, lac noted and bruising noted as well

## 2019-02-20 NOTE — DISCHARGE INSTRUCTIONS
Please make an appointment to follow up with [facility] [phone] in 5-7 days even if entirely better.

## 2019-02-25 ENCOUNTER — TRANSFERRED RECORDS (OUTPATIENT)
Dept: FAMILY MEDICINE | Facility: CLINIC | Age: 49
End: 2019-02-25

## 2019-03-07 ENCOUNTER — TRANSFERRED RECORDS (OUTPATIENT)
Dept: FAMILY MEDICINE | Facility: CLINIC | Age: 49
End: 2019-03-07

## 2019-03-28 ENCOUNTER — TRANSFERRED RECORDS (OUTPATIENT)
Dept: FAMILY MEDICINE | Facility: CLINIC | Age: 49
End: 2019-03-28

## 2019-04-26 ENCOUNTER — TRANSFERRED RECORDS (OUTPATIENT)
Dept: FAMILY MEDICINE | Facility: CLINIC | Age: 49
End: 2019-04-26

## 2019-05-30 ENCOUNTER — TRANSFERRED RECORDS (OUTPATIENT)
Dept: FAMILY MEDICINE | Facility: CLINIC | Age: 49
End: 2019-05-30

## 2019-08-17 DIAGNOSIS — F33.1 MAJOR DEPRESSIVE DISORDER, RECURRENT EPISODE, MODERATE (H): ICD-10-CM

## 2019-08-17 RX ORDER — BUPROPION HYDROCHLORIDE 150 MG/1
150 TABLET ORAL EVERY MORNING
Qty: 30 TABLET | Refills: 0 | COMMUNITY
Start: 2019-08-17 | End: 2019-09-10

## 2019-08-17 RX ORDER — ESCITALOPRAM OXALATE 20 MG/1
20 TABLET ORAL DAILY
Qty: 30 TABLET | Refills: 0 | COMMUNITY
Start: 2019-08-17 | End: 2019-09-10

## 2019-08-17 NOTE — TELEPHONE ENCOUNTER
Ishmael called to say he is out of meds and has an appt scheduled in 2 weeks.  Called a 30 day of leap and Wellbutrin to [facility]'s pharmacy.  Christiano is aware we cannot fill any more until he is seen

## 2019-08-19 RX ORDER — BUPROPION HYDROCHLORIDE 150 MG/1
TABLET ORAL
Qty: 90 TABLET | Refills: 0 | OUTPATIENT
Start: 2019-08-19

## 2019-08-19 RX ORDER — ESCITALOPRAM OXALATE 20 MG/1
TABLET ORAL
Qty: 90 TABLET | Refills: 0 | OUTPATIENT
Start: 2019-08-19

## 2019-08-19 NOTE — TELEPHONE ENCOUNTER
Refused Prescriptions:                       Disp   Refills    escitalopram (LEXAPRO) 20 MG tablet [Pharm*90 tab*0        Sig: TAKE 1 TABLET BY MOUTH EVERY DAY  Refused By: VERITO MERAZ  Reason for Refusal: Appt required, please call patient    buPROPion (WELLBUTRIN XL) 150 MG 24 hr tab*90 tab*0        Sig: TAKE 1 TABLET(150 MG) BY MOUTH EVERY MORNING  Refused By: VERITO MERAZ  Reason for Refusal: Appt required, please call patient    buPROPion (WELLBUTRIN XL) 150 MG 24 hr tab*90 tab*0        Sig: TAKE 1 TABLET BY MOUTH DAILY  Refused By: VERITO MERAZ  Reason for Refusal: Appt required, please call patient    Pt received a 30 day on 8-17  Has an appt on 9-10  Verito  [phone] (home) [phone] (work)

## 2019-09-09 PROBLEM — Z02.89: Status: ACTIVE | Noted: 2019-09-09

## 2019-09-09 NOTE — PROGRESS NOTES
SUBJECTIVE:   CC: Andrey Parks is an 48 year old male who presents for preventive health visit.     Healthy Habits:    Do you get at least three servings of calcium containing foods daily (dairy, green leafy vegetables, etc.)? yes    Amount of exercise or daily activities, outside of work: weight and core    Problems taking medications regularly No    Medication side effects: No    Have you had an eye exam in the past two years? yes    Do you see a dentist twice per year? yes  Do you have sleep apnea, excessive snoring or daytime drowsiness?     Depression and Anxiety Follow-Up    How are you doing with your depression since your last visit? No change    How are you doing with your anxiety since your last visit?  No change    Are you having other symptoms that might be associated with depression or anxiety? No    Have you had a significant life event? No     Do you have any concerns with your use of alcohol or other drugs? No       Body mass index is 33.23 kg/m .      Wt Readings from Last 5 Encounters:   09/10/19 94.8 kg (209 lb)   07/30/18 94.3 kg (208 lb)   03/02/17 93.5 kg (206 lb 3.2 oz)   11/20/15 89.8 kg (198 lb)   06/03/14 86.9 kg (191 lb 9.6 oz)       Will get info from [facility]    Arthritis in toes - both feet  - seeing [facility]  Right foot broken recently     The 10-year ASCVD risk score (Brodie BARRAGAN Jr., et al., 2013) is: 7%    Values used to calculate the score:      Age: 48 years      Sex: Male      Is Non- : No      Diabetic: No      Tobacco smoker: Yes      Systolic Blood Pressure: 128 mmHg      Is BP treated: No      HDL Cholesterol: 50 mg/dL      Total Cholesterol: 223 mg/dL             Smoking - pack a week  Chew 1/2 tin day  Not ready to quit    DANY - due for recheck    HIV screening has been done in the past      Influenza vaccine recommended  - declined     FOB today    Hemorrhoids - years  Due to constipation  Diet related  Getting more bleeding in the last 3 months  Has had  colonoscopy   Will request records      Varicose veins    not painful  Father with PAD      Social History     Tobacco Use     Smoking status: Current Every Day Smoker     Packs/day: 0.75     Years: 20.00     Pack years: 15.00     Types: Cigarettes     Last attempt to quit: 6/3/2010     Years since quittin.2     Smokeless tobacco: Current User     Types: Chew     Tobacco comment: quit chewing - a couple a day   Substance Use Topics     Alcohol use: Yes     Alcohol/week: 2.4 oz     Types: 4 Standard drinks or equivalent per week     Drug use: No     PHQ 3/2/2017 2018 9/10/2019   PHQ-9 Total Score 0 1 1   Q9: Thoughts of better off dead/self-harm past 2 weeks Not at all Not at all Not at all     BARBARA-7 SCORE 3/2/2017 2018 9/10/2019   Total Score 5 2 0     No flowsheet data found.  No flowsheet data found.  In the past two weeks have you had thoughts of suicide or self-harm?  No.    Do you have concerns about your personal safety or the safety of others?   No    Suicide Assessment Five-step Evaluation and Treatment (SAFE-T)    Today's PHQ-2 Score: No flowsheet data found.    Abuse: Current or Past(Physical, Sexual or Emotional)- No  Do you feel safe in your environment? Yes    Social History     Tobacco Use     Smoking status: Current Every Day Smoker     Packs/day: 0.75     Years: 20.00     Pack years: 15.00     Types: Cigarettes     Last attempt to quit: 6/3/2010     Years since quittin.2     Smokeless tobacco: Current User     Types: Chew     Tobacco comment: quit chewing - a couple a day   Substance Use Topics     Alcohol use: Yes     Alcohol/week: 2.4 oz     Types: 4 Standard drinks or equivalent per week     If you drink alcohol do you typically have >3 drinks per day or >7 drinks per week? No                      Last PSA:   [facility] PSA   Date Value Ref Range Status   09/10/2019 1.2 < OR = 4.0 ng/mL Final     Comment:     The total PSA value from this assay system is   standardized against the  WHO standard. The test   result will be approximately 20% lower when compared   to the equimolar-standardized total PSA (Andrea   [place]). Comparison of serial PSA results should be   interpreted with this fact in mind.     This test was performed using the Siemens   chemiluminescent method. Values obtained from   different assay methods cannot be used  interchangeably. PSA levels, regardless of  value, should not be interpreted as absolute  evidence of the presence or absence of disease.         Reviewed orders with patient. Reviewed health maintenance and updated orders accordingly - Yes  Lab work is in process  Labs reviewed in EPIC  BP Readings from Last 3 Encounters:   09/10/19 128/78   02/20/19 (!) 131/95   07/30/18 120/76    Wt Readings from Last 3 Encounters:   09/10/19 94.8 kg (209 lb)   07/30/18 94.3 kg (208 lb)   03/02/17 93.5 kg (206 lb 3.2 oz)                  Patient Active Problem List   Diagnosis     Health Care Home     ACP (advance care planning)     Major depressive disorder, recurrent episode, moderate (H)     DANY (obstructive sleep apnea)     Skin neoplasm - upper left back 3 mm erythematous macule 2017     Obesity (BMI 30-39.9)     Personal history of tobacco use, presenting hazards to health     Encounter for  health examination     Osteoarthritis of both feet, unspecified osteoarthritis type     Asymptomatic varicose veins of right lower extremity     Rectal bleeding     Oral herpes simplex infection     Past Surgical History:   Procedure Laterality Date     ARTHROSCOPY KNEE      right knee     COLONOSCOPY  2013    normal     ORTHOPEDIC SURGERY Right 1988    right ankle surgery     refractive surgery  2010    Lasix     SEPTOPLASTY, TURBINOPLASTY, COMBINED  11/2/2011    Procedure:COMBINED SEPTOPLASTY, TURBINOPLASTY; COMBINED SEPTOPLASTY  with Turbinate Reduction ; Surgeon:JACQUELINE KIRAN; Location:[facility] OR     TONSILLECTOMY      childhood       Social History     Tobacco Use      Smoking status: Current Every Day Smoker     Packs/day: 0.75     Years: 20.00     Pack years: 15.00     Types: Cigarettes     Last attempt to quit: 6/3/2010     Years since quittin.2     Smokeless tobacco: Current User     Types: Chew     Tobacco comment: quit chewing - a couple a day   Substance Use Topics     Alcohol use: Yes     Alcohol/week: 2.4 oz     Types: 4 Standard drinks or equivalent per week     Family History   Problem Relation Age of Onset     Cancer Mother 70        possible colon or ovarian that metastasized     Alcohol/Drug Mother      Depression Mother      Alcohol/Drug Father      Seizure Disorder Father      No Known Problems Brother      Other - See Comments Son         ? learning disability     Depression Brother      Other - See Comments Brother         ADHD     Depression Brother      Other - See Comments Son         autism spectrum     Depression Son      Cancer Paternal Grandfather         prostate     No Known Problems Sister      Other - See Comments Paternal Grandmother         stroke         Current Outpatient Medications   Medication Sig Dispense Refill     buPROPion (WELLBUTRIN XL) 150 MG 24 hr tablet Take 1 tablet (150 mg) by mouth every morning 90 tablet 3     escitalopram (LEXAPRO) 20 MG tablet Take 1 tablet (20 mg) by mouth daily 90 tablet 3     FISH OIL Take 2,000 mg by mouth daily.       Multiple Vitamin (MULTI-DAY) TABS Take 1 tablet by mouth daily.       Allergies   Allergen Reactions     Aspirin [Dihydroxyaluminum Aminoacetate] Rash     Recent Labs   Lab Test 09/10/19  1156 09/10/19  1039 09/10/19 07/30/18  1044 17  1130 11/20/15  1112  11  2315   A1C  --  5.4  --   --   --   --   --   --    LDL  --   --  142* 122* 106 138*   < >  --    HDL  --   --  50 49 56 52   < >  --    TRIG  --   --  155* 209* 105 100   < >  --    ALT  --   --   --  16 18 18   < >  --    CR  --   --  1.32* 1.16 1.10 1.33   < > 1.01   GFRESTIMATED  --   --  64 76 81 65   < > 83    GFRESTBLACK  --   --   --   --   --   --   --  >90   POTASSIUM  --   --  4.84 4.4 4.0 4.3   < > 3.9   TSH 2.64  --   --   --   --   --   --   --     < > = values in this interval not displayed.        Reviewed and updated as needed this visit by clinical staff  Allergies  Meds  Problems         Reviewed and updated as needed this visit by Provider        Past Medical History:   Diagnosis Date     Anxiety      Chronic infection     Lymes disease 2011, 1997     Depressive disorder      Lyme disease 2012    hearing loss right ear     Onychomycosis     left toe - treated with PO Lamisil     Toe fracture, right 2019    podagra      Past Surgical History:   Procedure Laterality Date     ARTHROSCOPY KNEE      right knee     COLONOSCOPY  2013    normal     ORTHOPEDIC SURGERY Right 1988    right ankle surgery     refractive surgery  2010    Lasix     SEPTOPLASTY, TURBINOPLASTY, COMBINED  11/2/2011    Procedure:COMBINED SEPTOPLASTY, TURBINOPLASTY; COMBINED SEPTOPLASTY  with Turbinate Reduction ; Surgeon:JACQUELINE KIRAN; Location:[facility] OR     TONSILLECTOMY      childhood       ROS:  CONSTITUTIONAL: NEGATIVE for fever, chills, change in weight  INTEGUMENTARY/SKIN: NEGATIVE for worrisome rashes, moles or lesions  EYES: NEGATIVE for vision changes or irritation  ENT: NEGATIVE for ear, mouth and throat problems  RESP: NEGATIVE for significant cough or SOB  CV: NEGATIVE for chest pain, palpitations or peripheral edema  GI: NEGATIVE for nausea, abdominal pain, heartburn, or change in bowel habits   male: negative for dysuria, hematuria, decreased urinary stream, erectile dysfunction, urethral discharge  MUSCULOSKELETAL: NEGATIVE for significant arthralgias or myalgia  NEURO: NEGATIVE for weakness, dizziness or paresthesias  PSYCHIATRIC: NEGATIVE for changes in mood or affect    OBJECTIVE:   /78 (BP Location: Right arm, Patient Position: Sitting, Cuff Size: Adult Large)   Pulse 67   Temp 97.7  F (36.5  C) (Oral)   Ht  "1.689 m (5' 6.5\")   Wt 94.8 kg (209 lb)   SpO2 99%   BMI 33.23 kg/m    EXAM:  GENERAL: healthy, alert and no distress  EYES: Eyes grossly normal to inspection, PERRL and conjunctivae and sclerae normal  HENT: ear canals and TM's normal, nose and mouth without ulcers or lesions  NECK: no adenopathy, no asymmetry, masses, or scars and thyroid normal to palpation  RESP: lungs clear to auscultation - no rales, rhonchi or wheezes  CV: regular rate and rhythm, normal S1 S2, no S3 or S4, no murmur, click or rub, no peripheral edema and peripheral pulses strong  ABDOMEN: soft, nontender, no hepatosplenomegaly, no masses and bowel sounds normal   (male): normal male genitalia without lesions or urethral discharge, no hernia  MS: no gross musculoskeletal defects noted, no edema  SKIN: lower left lip - erythematous lesion with crusting present 2 mm - varicose veins right lower leg  NEURO: Normal strength and tone, mentation intact and speech normal  PSYCH: mentation appears normal, affect normal/bright  Rectal  - no external hemorrhoids, normal sphincter tone.  Prostate normal  FOB taken      Diagnostic Test Results:  Labs reviewed in Epic    ASSESSMENT/PLAN:   1. Routine general medical examination at a health care facility    - CL AFF HEMOGRAM/PLATE/DIFF ([facility])  - Comprehensive Metobolic Panel ([facility])  - VENOUS COLLECTION  - Lipid Panel ([facility])  - FOB (Fecal Occult Blood) ([facility])  - TSH with free T4 reflex ([facility])  - Hemoglobin A1c  - HCL PSA, SCREENING ([facility])    2. Major depressive disorder, recurrent episode, moderate (H)  stable  - buPROPion (WELLBUTRIN XL) 150 MG 24 hr tablet; Take 1 tablet (150 mg) by mouth every morning  Dispense: 90 tablet; Refill: 3  - escitalopram (LEXAPRO) 20 MG tablet; Take 1 tablet (20 mg) by mouth daily  Dispense: 90 tablet; Refill: 3    3. Skin neoplasm - upper left back 3 mm erythematous macule 2017  stable    4. DANY (obstructive sleep apnea)  Back to [facility]  - SLEEP EVALUATION & MANAGEMENT " "REFERRAL - ADULT -    5. Obesity (BMI 30-39.9)    - Comprehensive Metobolic Panel ([facility])  - VENOUS COLLECTION  - Lipid Panel ([facility])    6. Personal history of tobacco use, presenting hazards to health  Advised cessation  Wife smokes so hard for him to quit    7. Encounter for  health examination    - CL AFF HEMOGRAM/PLATE/DIFF ([facility])  - Comprehensive Metobolic Panel ([facility])  - VENOUS COLLECTION  - Lipid Panel ([facility])  - FOB (Fecal Occult Blood) ([facility])  - TSH with free T4 reflex ([facility])  - Hemoglobin A1c  - HCL PSA, SCREENING ([facility])  - SLEEP EVALUATION & MANAGEMENT REFERRAL - ADULT -  - Echocardiogram Complete; Future    8. Oral herpes simplex infection  Pt doesn't want Valtrex  Educated on this virus  Wife has them as well    9. Rectal bleeding  Colorectal eval advised  - COLORECTAL SURGERY REFERRAL    10. Asymptomatic varicose veins of right lower extremity  obs.    11. Osteoarthritis of both feet, unspecified osteoarthritis type      12. Elevated serum creatinine  Recheck 1 month  Push fluids  No nsaids  - Creatinine ([facility]); Standing  - VENOUS COLLECTION; Standing    COUNSELING:  Special attention given to:        Regular exercise       Healthy diet/nutrition       HIV screeninx in teen years, 1x in adult years, and at intervals if high risk       Advance Care Planning    Estimated body mass index is 33.23 kg/m  as calculated from the following:    Height as of this encounter: 1.689 m (5' 6.5\").    Weight as of this encounter: 94.8 kg (209 lb).    Weight management plan: Discussed healthy diet and exercise guidelines     reports that he has been smoking cigarettes.  He has a 15.00 pack-year smoking history. His smokeless tobacco use includes chew.  Tobacco Cessation Action Plan: Information offered: Patient not interested at this time    Counseling Resources:  ATP IV Guidelines  Pooled Cohorts Equation Calculator  FRAX Risk Assessment  ICSI Preventive Guidelines  Dietary Guidelines for Americans, " 2010  USDA's MyPlate  ASA Prophylaxis  Lung CA Screening    SOTO Schumacher  [facility]

## 2019-09-10 ENCOUNTER — OFFICE VISIT (OUTPATIENT)
Dept: FAMILY MEDICINE | Facility: CLINIC | Age: 49
End: 2019-09-10

## 2019-09-10 VITALS
TEMPERATURE: 97.7 F | BODY MASS INDEX: 32.8 KG/M2 | WEIGHT: 209 LBS | HEIGHT: 67 IN | OXYGEN SATURATION: 99 % | SYSTOLIC BLOOD PRESSURE: 128 MMHG | DIASTOLIC BLOOD PRESSURE: 78 MMHG | HEART RATE: 67 BPM

## 2019-09-10 DIAGNOSIS — M19.071 OSTEOARTHRITIS OF BOTH FEET, UNSPECIFIED OSTEOARTHRITIS TYPE: ICD-10-CM

## 2019-09-10 DIAGNOSIS — Z02.89 ENCOUNTER FOR FIREFIGHTER HEALTH EXAMINATION: ICD-10-CM

## 2019-09-10 DIAGNOSIS — Z87.891 PERSONAL HISTORY OF TOBACCO USE, PRESENTING HAZARDS TO HEALTH: ICD-10-CM

## 2019-09-10 DIAGNOSIS — G47.33 OSA (OBSTRUCTIVE SLEEP APNEA): ICD-10-CM

## 2019-09-10 DIAGNOSIS — D49.2 SKIN NEOPLASM: ICD-10-CM

## 2019-09-10 DIAGNOSIS — F33.1 MAJOR DEPRESSIVE DISORDER, RECURRENT EPISODE, MODERATE (H): ICD-10-CM

## 2019-09-10 DIAGNOSIS — Z00.00 ROUTINE GENERAL MEDICAL EXAMINATION AT A HEALTH CARE FACILITY: Primary | ICD-10-CM

## 2019-09-10 DIAGNOSIS — I83.91 ASYMPTOMATIC VARICOSE VEINS OF RIGHT LOWER EXTREMITY: ICD-10-CM

## 2019-09-10 DIAGNOSIS — E66.9 OBESITY (BMI 30-39.9): ICD-10-CM

## 2019-09-10 DIAGNOSIS — K62.5 RECTAL BLEEDING: ICD-10-CM

## 2019-09-10 DIAGNOSIS — B00.2 ORAL HERPES SIMPLEX INFECTION: ICD-10-CM

## 2019-09-10 DIAGNOSIS — M19.072 OSTEOARTHRITIS OF BOTH FEET, UNSPECIFIED OSTEOARTHRITIS TYPE: ICD-10-CM

## 2019-09-10 DIAGNOSIS — R79.89 ELEVATED SERUM CREATININE: ICD-10-CM

## 2019-09-10 LAB
% GRANULOCYTES: 50.3 %
ALBUMIN SERPL-MCNC: 4.9 G/DL (ref 3.6–5.1)
ALBUMIN/GLOB SERPL: 2 {RATIO} (ref 1–2.5)
ALP SERPL-CCNC: 78 U/L (ref 33–130)
ALT 1742-6: 9 U/L (ref 5–30)
AST 1920-8: 10 U/L (ref 7–31)
BILIRUB SERPL-MCNC: 0.5 MG/DL (ref 0.2–1.2)
BUN SERPL-MCNC: 30 MG/DL (ref 7–25)
BUN/CREATININE RATIO: 21.6 (ref 6–22)
CALCIUM SERPL-MCNC: 10.2 MG/DL (ref 8.6–10.3)
CHLORIDE SERPLBLD-SCNC: 104.5 MMOL/L (ref 98–110)
CHOLEST SERPL-MCNC: 223 MG/DL (ref 0–199)
CHOLEST/HDLC SERPL: 4 {RATIO} (ref 0–5)
CO2 SERPL-SCNC: 29.4 MMOL/L (ref 20–32)
CREAT SERPL-MCNC: 1.32 MG/DL (ref 0.7–1.18)
GFR SERPL CREATININE-BSD FRML MDRD: 64 ML/MIN/1.73M2
GLOBULIN, CALCULATED - QUEST: 2.4 (ref 1.9–3.7)
GLUCOSE SERPL-MCNC: 102 MG/DL (ref 60–99)
HBA1C MFR BLD: 5.4 % (ref 4–7)
HCT VFR BLD AUTO: 46.7 % (ref 40–53)
HDLC SERPL-MCNC: 50 MG/DL (ref 40–150)
HEMOGLOBIN: 15.6 G/DL (ref 13.3–17.7)
LDLC SERPL CALC-MCNC: 142 MG/DL (ref 0–130)
LYMPHOCYTES NFR BLD AUTO: 39.4 %
MCH RBC QN AUTO: 31.9 PG (ref 26–33)
MCHC RBC AUTO-ENTMCNC: 33.4 G/DL (ref 31–36)
MCV RBC AUTO: 95.5 FL (ref 78–100)
MONOCYTES NFR BLD AUTO: 10.3 %
OCCULT BLOOD: NORMAL
PLATELET COUNT - QUEST: 197 10^9/L (ref 150–375)
POTASSIUM SERPL-SCNC: 4.84 MMOL/L (ref 3.5–5.3)
PROT SERPL-MCNC: 7.3 G/DL (ref 6.1–8.1)
RBC # BLD AUTO: 4.89 10*12/L (ref 4.4–5.9)
SODIUM SERPL-SCNC: 141.7 MMOL/L (ref 135–146)
TRIGL SERPL-MCNC: 155 MG/DL (ref 0–149)
WBC # BLD AUTO: 5.3 10*9/L (ref 4–11)

## 2019-09-10 PROCEDURE — 36415 COLL VENOUS BLD VENIPUNCTURE: CPT | Performed by: PHYSICIAN ASSISTANT

## 2019-09-10 PROCEDURE — 80050 GENERAL HEALTH PANEL: CPT | Mod: 90 | Performed by: PHYSICIAN ASSISTANT

## 2019-09-10 PROCEDURE — 80061 LIPID PANEL: CPT | Performed by: PHYSICIAN ASSISTANT

## 2019-09-10 PROCEDURE — 82274 ASSAY TEST FOR BLOOD FECAL: CPT | Performed by: PHYSICIAN ASSISTANT

## 2019-09-10 PROCEDURE — 99396 PREV VISIT EST AGE 40-64: CPT | Performed by: PHYSICIAN ASSISTANT

## 2019-09-10 PROCEDURE — 84153 ASSAY OF PSA TOTAL: CPT | Mod: 90 | Performed by: PHYSICIAN ASSISTANT

## 2019-09-10 PROCEDURE — 83036 HEMOGLOBIN GLYCOSYLATED A1C: CPT | Performed by: PHYSICIAN ASSISTANT

## 2019-09-10 RX ORDER — BUPROPION HYDROCHLORIDE 150 MG/1
150 TABLET ORAL EVERY MORNING
Qty: 90 TABLET | Refills: 3 | Status: SHIPPED | OUTPATIENT
Start: 2019-09-10 | End: 2020-12-17

## 2019-09-10 RX ORDER — ESCITALOPRAM OXALATE 20 MG/1
20 TABLET ORAL DAILY
Qty: 90 TABLET | Refills: 3 | Status: SHIPPED | OUTPATIENT
Start: 2019-09-10 | End: 2020-12-17

## 2019-09-10 ASSESSMENT — ANXIETY QUESTIONNAIRES
3. WORRYING TOO MUCH ABOUT DIFFERENT THINGS: NOT AT ALL
5. BEING SO RESTLESS THAT IT IS HARD TO SIT STILL: NOT AT ALL
IF YOU CHECKED OFF ANY PROBLEMS ON THIS QUESTIONNAIRE, HOW DIFFICULT HAVE THESE PROBLEMS MADE IT FOR YOU TO DO YOUR WORK, TAKE CARE OF THINGS AT HOME, OR GET ALONG WITH OTHER PEOPLE: NOT DIFFICULT AT ALL
6. BECOMING EASILY ANNOYED OR IRRITABLE: NOT AT ALL
GAD7 TOTAL SCORE: 0
2. NOT BEING ABLE TO STOP OR CONTROL WORRYING: NOT AT ALL
7. FEELING AFRAID AS IF SOMETHING AWFUL MIGHT HAPPEN: NOT AT ALL
1. FEELING NERVOUS, ANXIOUS, OR ON EDGE: NOT AT ALL

## 2019-09-10 ASSESSMENT — PATIENT HEALTH QUESTIONNAIRE - PHQ9
5. POOR APPETITE OR OVEREATING: NOT AT ALL
SUM OF ALL RESPONSES TO PHQ QUESTIONS 1-9: 1

## 2019-09-10 ASSESSMENT — MIFFLIN-ST. JEOR: SCORE: 1778.71

## 2019-09-10 NOTE — NURSING NOTE
Chief Complaint   Patient presents with     Physical     fasting, routine physical      Pre-visit Screening:  Immunizations:  up to date  Colonoscopy:  NA  Mammogram: NA  Asthma Action Test/Plan: NA  PHQ9:  Gven today   GAD7:  Given today  Questioned patient about current smoking habits & chew regularly  Pt. currently smokes.  Advised about smoking cessation.  Ok to leave detailed message on voice mail for today's visit only yes, phone # [phone]    Declined ACP

## 2019-09-11 LAB
ABBOTT PSA - QUEST: 1.2 NG/ML
TSH SERPL-ACNC: 2.64 MIU/L (ref 0.4–4.5)

## 2019-09-11 ASSESSMENT — ANXIETY QUESTIONNAIRES: GAD7 TOTAL SCORE: 0

## 2019-09-12 PROBLEM — B00.2 ORAL HERPES SIMPLEX INFECTION: Status: ACTIVE | Noted: 2019-09-12

## 2019-09-12 PROBLEM — M19.072 OSTEOARTHRITIS OF BOTH FEET, UNSPECIFIED OSTEOARTHRITIS TYPE: Status: ACTIVE | Noted: 2019-09-12

## 2019-09-12 PROBLEM — I83.91 ASYMPTOMATIC VARICOSE VEINS OF RIGHT LOWER EXTREMITY: Status: ACTIVE | Noted: 2019-09-12

## 2019-09-12 PROBLEM — K62.5 RECTAL BLEEDING: Status: ACTIVE | Noted: 2019-09-12

## 2019-09-12 PROBLEM — M19.071 OSTEOARTHRITIS OF BOTH FEET, UNSPECIFIED OSTEOARTHRITIS TYPE: Status: ACTIVE | Noted: 2019-09-12

## 2019-09-16 ENCOUNTER — TRANSFERRED RECORDS (OUTPATIENT)
Dept: FAMILY MEDICINE | Facility: CLINIC | Age: 49
End: 2019-09-16

## 2019-09-21 DIAGNOSIS — F33.1 MAJOR DEPRESSIVE DISORDER, RECURRENT EPISODE, MODERATE (H): ICD-10-CM

## 2019-09-23 ENCOUNTER — TRANSFERRED RECORDS (OUTPATIENT)
Dept: FAMILY MEDICINE | Facility: CLINIC | Age: 49
End: 2019-09-23

## 2019-09-23 RX ORDER — BUPROPION HYDROCHLORIDE 150 MG/1
TABLET ORAL
Qty: 30 TABLET | Refills: 0 | OUTPATIENT
Start: 2019-09-23

## 2019-09-23 RX ORDER — ESCITALOPRAM OXALATE 20 MG/1
TABLET ORAL
Qty: 30 TABLET | Refills: 0 | OUTPATIENT
Start: 2019-09-23

## 2019-09-23 NOTE — TELEPHONE ENCOUNTER
Refused Prescriptions:                       Disp   Refills    escitalopram (LEXAPRO) 20 MG tablet [Pharm*30 tab*0        Sig: TAKE 1 TABLET BY MOUTH EVERY DAY  Refused By: VERITO MERAZ  Reason for Refusal: Request already responded to by other means (phone, fax, etc.)    buPROPion (WELLBUTRIN XL) 150 MG 24 hr tab*30 tab*0        Sig: TAKE 1 TABLET BY MOUTH DAILY  Refused By: VERITO MERAZ  Reason for Refusal: Request already responded to by other means (phone, fax, etc.)    Refilled 9- for one year  Verito  [phone] (home) [phone] (work)

## 2019-10-07 ENCOUNTER — HOSPITAL ENCOUNTER (OUTPATIENT)
Dept: CARDIOLOGY | Facility: CLINIC | Age: 49
Discharge: HOME | End: 2019-10-07
Attending: PHYSICIAN ASSISTANT | Admitting: PHYSICIAN ASSISTANT
Payer: COMMERCIAL

## 2019-10-07 DIAGNOSIS — Z02.89 ENCOUNTER FOR FIREFIGHTER HEALTH EXAMINATION: ICD-10-CM

## 2019-10-07 PROCEDURE — 25500064 ZZH RX 255 OP 636: Performed by: PHYSICIAN ASSISTANT

## 2019-10-07 PROCEDURE — 93306 TTE W/DOPPLER COMPLETE: CPT | Mod: 26 | Performed by: INTERNAL MEDICINE

## 2019-10-07 PROCEDURE — 40000264 ECHOCARDIOGRAM COMPLETE

## 2019-10-07 RX ADMIN — HUMAN ALBUMIN MICROSPHERES AND PERFLUTREN 3 ML: 10; .22 INJECTION, SOLUTION INTRAVENOUS at 09:26

## 2019-10-10 ENCOUNTER — MYC MEDICAL ADVICE (OUTPATIENT)
Dept: FAMILY MEDICINE | Facility: CLINIC | Age: 49
End: 2019-10-10

## 2019-10-10 DIAGNOSIS — I51.89 LEFT VENTRICULAR HYPOKINESIS: ICD-10-CM

## 2019-10-10 DIAGNOSIS — Z87.891 PERSONAL HISTORY OF TOBACCO USE, PRESENTING HAZARDS TO HEALTH: Primary | ICD-10-CM

## 2019-10-10 NOTE — TELEPHONE ENCOUNTER
Spoke with pt. He is willing to do MRI    Chantix prescribed  I reviewed the patient information handout from Up To Date on this medication including side effects with the patient.      Bethany Monge PA-C  10/10/2019

## 2019-10-10 NOTE — TELEPHONE ENCOUNTER
I called [facility] ( [phone] ), left a message for Dr. Hernandez's nurse asking about the Cardiac MRI that is suggested due to Echo that was done 10/7/2019.      Bethany Monge PA-C is asking if the Cardiac MRI is with or with contrast

## 2019-10-10 NOTE — TELEPHONE ENCOUNTER
Rani with [facility],  office called me back RE what MRI to order    MRI Cardiac with contrast is recommended.     Please complete the pending order for MRI Cardiac with Contrast    Thank you

## 2019-10-28 ENCOUNTER — TRANSFERRED RECORDS (OUTPATIENT)
Dept: FAMILY MEDICINE | Facility: CLINIC | Age: 49
End: 2019-10-28

## 2019-10-31 ENCOUNTER — HOSPITAL ENCOUNTER (OUTPATIENT)
Dept: CARDIOLOGY | Facility: CLINIC | Age: 49
Discharge: HOME | End: 2019-10-31
Attending: PHYSICIAN ASSISTANT | Admitting: PHYSICIAN ASSISTANT
Payer: COMMERCIAL

## 2019-10-31 ENCOUNTER — TELEPHONE (OUTPATIENT)
Dept: FAMILY MEDICINE | Facility: CLINIC | Age: 49
End: 2019-10-31

## 2019-10-31 VITALS — HEART RATE: 54 BPM | DIASTOLIC BLOOD PRESSURE: 87 MMHG | SYSTOLIC BLOOD PRESSURE: 130 MMHG

## 2019-10-31 DIAGNOSIS — I51.89 LEFT VENTRICULAR HYPOKINESIS: ICD-10-CM

## 2019-10-31 DIAGNOSIS — Z87.891 PERSONAL HISTORY OF TOBACCO USE, PRESENTING HAZARDS TO HEALTH: ICD-10-CM

## 2019-10-31 PROCEDURE — A9585 GADOBUTROL INJECTION: HCPCS | Performed by: PHYSICIAN ASSISTANT

## 2019-10-31 PROCEDURE — 75561 CARDIAC MRI FOR MORPH W/DYE: CPT

## 2019-10-31 PROCEDURE — 75561 CARDIAC MRI FOR MORPH W/DYE: CPT | Mod: 26 | Performed by: INTERNAL MEDICINE

## 2019-10-31 PROCEDURE — 25500064 ZZH RX 255 OP 636: Performed by: PHYSICIAN ASSISTANT

## 2019-10-31 RX ORDER — GADOBUTROL 604.72 MG/ML
5-65 INJECTION INTRAVENOUS ONCE
Status: COMPLETED | OUTPATIENT
Start: 2019-10-31 | End: 2019-10-31

## 2019-10-31 RX ADMIN — GADOBUTROL 13 ML: 604.72 INJECTION INTRAVENOUS at 12:53

## 2019-10-31 NOTE — TELEPHONE ENCOUNTER
Dr. Carolina Wong from [facility] called to inform Bethany that Andrey' heart MRI came back with normal heart function results.        Clinic phone #: [phone]  Her cell #: [phone]

## 2019-11-07 ENCOUNTER — HEALTH MAINTENANCE LETTER (OUTPATIENT)
Age: 49
End: 2019-11-07

## 2019-12-30 ENCOUNTER — TRANSFERRED RECORDS (OUTPATIENT)
Dept: FAMILY MEDICINE | Facility: CLINIC | Age: 49
End: 2019-12-30

## 2020-07-30 ENCOUNTER — TELEPHONE (OUTPATIENT)
Dept: FAMILY MEDICINE | Facility: CLINIC | Age: 50
End: 2020-07-30

## 2020-07-30 NOTE — TELEPHONE ENCOUNTER
Pt dropped off a clearance form for scuba diving to be filled out by Bethany. Pt filled out his part and there is a section for provider. I can call pt when ready to .    In your inbox in your pod.      Thanks, Virginie Hearn phone #[phone]

## 2020-11-29 ENCOUNTER — HEALTH MAINTENANCE LETTER (OUTPATIENT)
Age: 50
End: 2020-11-29

## 2020-12-11 ENCOUNTER — MYC REFILL (OUTPATIENT)
Dept: FAMILY MEDICINE | Facility: CLINIC | Age: 50
End: 2020-12-11

## 2020-12-11 DIAGNOSIS — F33.1 MAJOR DEPRESSIVE DISORDER, RECURRENT EPISODE, MODERATE (H): ICD-10-CM

## 2020-12-11 RX ORDER — ESCITALOPRAM OXALATE 20 MG/1
20 TABLET ORAL DAILY
Qty: 90 TABLET | Refills: 3 | Status: CANCELLED | OUTPATIENT
Start: 2020-12-11

## 2020-12-11 RX ORDER — BUPROPION HYDROCHLORIDE 150 MG/1
150 TABLET ORAL EVERY MORNING
Qty: 90 TABLET | Refills: 3 | Status: CANCELLED | OUTPATIENT
Start: 2020-12-11

## 2020-12-11 NOTE — TELEPHONE ENCOUNTER
Received incoming refill request for  Pending Prescriptions:                       Disp   Refills    buPROPion (WELLBUTRIN XL) 150 MG 24 hr ta*90 tab*3            Sig: Take 1 tablet (150 mg) by mouth every morning    escitalopram (LEXAPRO) 20 MG tablet       90 tab*3            Sig: Take 1 tablet (20 mg) by mouth daily    Patient is overdue for an OV and needs to be seen for an OV for any refills.

## 2020-12-17 ENCOUNTER — OFFICE VISIT (OUTPATIENT)
Dept: FAMILY MEDICINE | Facility: CLINIC | Age: 50
End: 2020-12-17

## 2020-12-17 VITALS
TEMPERATURE: 97.9 F | HEART RATE: 69 BPM | DIASTOLIC BLOOD PRESSURE: 80 MMHG | OXYGEN SATURATION: 96 % | SYSTOLIC BLOOD PRESSURE: 118 MMHG | BODY MASS INDEX: 33.46 KG/M2 | HEIGHT: 67 IN | WEIGHT: 213.2 LBS

## 2020-12-17 DIAGNOSIS — G47.33 OSA (OBSTRUCTIVE SLEEP APNEA): ICD-10-CM

## 2020-12-17 DIAGNOSIS — Z87.891 PERSONAL HISTORY OF TOBACCO USE, PRESENTING HAZARDS TO HEALTH: ICD-10-CM

## 2020-12-17 DIAGNOSIS — E66.9 OBESITY (BMI 30-39.9): ICD-10-CM

## 2020-12-17 DIAGNOSIS — Z02.89 ENCOUNTER FOR FIREFIGHTER HEALTH EXAMINATION: ICD-10-CM

## 2020-12-17 DIAGNOSIS — Z12.11 SCREENING FOR COLON CANCER: ICD-10-CM

## 2020-12-17 DIAGNOSIS — F33.1 MAJOR DEPRESSIVE DISORDER, RECURRENT EPISODE, MODERATE (H): Primary | ICD-10-CM

## 2020-12-17 PROCEDURE — 99214 OFFICE O/P EST MOD 30 MIN: CPT | Performed by: PHYSICIAN ASSISTANT

## 2020-12-17 PROCEDURE — 71046 X-RAY EXAM CHEST 2 VIEWS: CPT | Performed by: PHYSICIAN ASSISTANT

## 2020-12-17 RX ORDER — BUPROPION HYDROCHLORIDE 150 MG/1
150 TABLET ORAL EVERY MORNING
Qty: 90 TABLET | Refills: 3 | Status: SHIPPED | OUTPATIENT
Start: 2020-12-17 | End: 2023-10-16

## 2020-12-17 RX ORDER — ESCITALOPRAM OXALATE 10 MG/1
10 TABLET ORAL DAILY
Qty: 90 TABLET | Refills: 3 | Status: SHIPPED | OUTPATIENT
Start: 2020-12-17 | End: 2023-10-16 | Stop reason: DRUGHIGH

## 2020-12-17 RX ORDER — ESCITALOPRAM OXALATE 20 MG/1
20 TABLET ORAL DAILY
Qty: 90 TABLET | Refills: 3 | Status: SHIPPED | OUTPATIENT
Start: 2020-12-17

## 2020-12-17 ASSESSMENT — PATIENT HEALTH QUESTIONNAIRE - PHQ9
SUM OF ALL RESPONSES TO PHQ QUESTIONS 1-9: 1
5. POOR APPETITE OR OVEREATING: MORE THAN HALF THE DAYS

## 2020-12-17 ASSESSMENT — ANXIETY QUESTIONNAIRES
GAD7 TOTAL SCORE: 6
7. FEELING AFRAID AS IF SOMETHING AWFUL MIGHT HAPPEN: NOT AT ALL
5. BEING SO RESTLESS THAT IT IS HARD TO SIT STILL: SEVERAL DAYS
6. BECOMING EASILY ANNOYED OR IRRITABLE: SEVERAL DAYS
3. WORRYING TOO MUCH ABOUT DIFFERENT THINGS: SEVERAL DAYS
1. FEELING NERVOUS, ANXIOUS, OR ON EDGE: SEVERAL DAYS
2. NOT BEING ABLE TO STOP OR CONTROL WORRYING: NOT AT ALL

## 2020-12-17 ASSESSMENT — MIFFLIN-ST. JEOR: SCORE: 1787.76

## 2020-12-17 NOTE — PROGRESS NOTES
Subjective     Nursing Notes:   Leslie More, [facility]  12/17/2020 12:42 PM  Signed  Andrey is here for a nonfasting med check.    Pre-Visit Screening:  Immunizations:UTd  Colonoscopy:NA  Mammogram:NA  Asthma Action Test/Plan:NA  PHQ9:today  GAD7:today  Questioned patient about current smoking habits Pt.former smoker  OK to leave a detailed message on voice mail for today's visit yes, phone # [phone]           Andrey Parks is a 50 year old male who presents to clinic today for the following health issues:    HPI       Smoking - quit  Chewing daily.    Depression Followup    How are you doing with your depression since your last visit? No change    Are you having other symptoms that might be associated with depression? No    Have you had a significant life event?  OTHER: COVID -      Are you feeling anxious or having panic attacks?   No    Do you have any concerns with your use of alcohol or other drugs? No     Slight anxiety still present.  Works as   Lot of stress with COVID    Did have COVID beginning of Oct.   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      Colonoscopy  Due -  Mother passed - unsure if it was colon or ovarian cancer as it had metastasized    Pt is still chewing tobacco  Quit smoking on Chantix        Social History     Tobacco Use     Smoking status: Former Smoker     Packs/day: 0.75     Years: 20.00     Pack years: 15.00     Types: Cigarettes     Quit date: 6/3/2010     Years since quitting: 10.5     Smokeless tobacco: Current User     Types: Chew     Tobacco comment: quit chewing - a couple a day   Substance Use Topics     Alcohol use: Yes     Alcohol/week: 4.0 standard drinks     Types: 4 Standard drinks or equivalent per week     Drug use: No     PHQ 7/30/2018 9/10/2019 12/17/2020   PHQ-9 Total Score 1 1 1    Q9: Thoughts of better off dead/self-harm past 2 weeks Not at all Not at all Not at all     BARBARA-7 SCORE 7/30/2018 9/10/2019 12/17/2020   Total Score 2 0 6     Last PHQ-9 12/17/2020   1.  Little interest or pleasure in doing things 0   2.  Feeling down, depressed, or hopeless 0   3.  Trouble falling or staying asleep, or sleeping too much 0   4.  Feeling tired or having little energy 1   5.  Poor appetite or overeating 0   6.  Feeling bad about yourself 0   7.  Trouble concentrating 0   8.  Moving slowly or restless 0   Q9: Thoughts of better off dead/self-harm past 2 weeks 0   PHQ-9 Total Score 1   Difficulty at work, home, or with people Not difficult at all     BARBARA-7  12/17/2020   1. Feeling nervous, anxious, or on edge 1   2. Not being able to stop or control worrying 0   3. Worrying too much about different things 1   4. Trouble relaxing 2   5. Being so restless that it is hard to sit still 1   6. Becoming easily annoyed or irritable 1   7. Feeling afraid, as if something awful might happen 0   BARBARA-7 Total Score 6   If you checked any problems, how difficult have they made it for you to do your work, take care of things at home, or get along with other people? -     In the past two weeks have you had thoughts of suicide or self-harm?  No.    Do you have concerns about your personal safety or the safety of others?   No    Suicide Assessment Five-step Evaluation and Treatment (SAFE-T)        Do you have any barriers to taking your medication daily? none  How many days per week do you exercise and for how long?   Describe your eating habits: good        Review of Systems   Constitutional, HEENT, cardiovascular, pulmonary, gi and gu systems are negative, except as otherwise noted.      Patient Active Problem List   Diagnosis     Health Care Home     ACP (advance care planning)     Major depressive disorder, recurrent episode, moderate (H)     DANY (obstructive sleep apnea)     Skin neoplasm - upper left  back 3 mm erythematous macule 2017     Obesity (BMI 30-39.9)     Personal history of tobacco use, presenting hazards to health -chewing     Encounter for  health examination     Osteoarthritis of both feet, unspecified osteoarthritis type     Asymptomatic varicose veins of right lower extremity     Rectal bleeding     Oral herpes simplex infection     Past Surgical History:   Procedure Laterality Date     ARTHROSCOPY KNEE      right knee     COLONOSCOPY  2013    normal     ORTHOPEDIC SURGERY Right 1988    right ankle surgery     refractive surgery  2010    Lasix     SEPTOPLASTY, TURBINOPLASTY, COMBINED  11/2/2011    Procedure:COMBINED SEPTOPLASTY, TURBINOPLASTY; COMBINED SEPTOPLASTY  with Turbinate Reduction ; Surgeon:JACQUELINE KIRAN; Location:[facility] OR     TONSILLECTOMY      childhood       Social History     Tobacco Use     Smoking status: Former Smoker     Packs/day: 0.75     Years: 20.00     Pack years: 15.00     Types: Cigarettes     Quit date: 6/3/2010     Years since quitting: 10.5     Smokeless tobacco: Current User     Types: Chew     Tobacco comment: quit chewing - a couple a day   Substance Use Topics     Alcohol use: Yes     Alcohol/week: 4.0 standard drinks     Types: 4 Standard drinks or equivalent per week     Family History   Problem Relation Age of Onset     Cancer Mother 70        possible colon or ovarian that metastasized     Alcohol/Drug Mother      Depression Mother      Alcohol/Drug Father      Seizure Disorder Father      No Known Problems Brother      Other - See Comments Son         ? learning disability     Depression Brother      Other - See Comments Brother         ADHD     Depression Brother      Other - See Comments Son         autism spectrum     Depression Son      Cancer Paternal Grandfather         prostate     No Known Problems Sister      Other - See Comments Paternal Grandmother         stroke           Current Outpatient Medications   Medication Sig Dispense Refill      "buPROPion (WELLBUTRIN XL) 150 MG 24 hr tablet Take 1 tablet (150 mg) by mouth every morning 90 tablet 3     escitalopram (LEXAPRO) 10 MG tablet Take 1 tablet (10 mg) by mouth daily Take with 20 mg tablet 90 tablet 3     escitalopram (LEXAPRO) 20 MG tablet Take 1 tablet (20 mg) by mouth daily Take with 10 mg tablet 90 tablet 3     FISH OIL Take 2,000 mg by mouth daily.       Multiple Vitamin (MULTI-DAY) TABS Take 1 tablet by mouth daily.       varenicline (CHANTIX AMARA) 0.5 MG X 11 & 1 MG X 42 tablet Take 0.5 mg tab daily for 3 days, THEN 0.5 mg tab twice daily for 4 days, THEN 1 mg twice daily. 53 tablet 0     Allergies   Allergen Reactions     Aspirin [Dihydroxyaluminum Aminoacetate] Rash     Recent Labs   Lab Test 09/10/19  1156 09/10/19  1039 09/10/19 07/30/18  1044 03/02/17  1130 11/20/15  1112   A1C  --  5.4  --   --   --   --    LDL  --   --  142* 122* 106 138*   HDL  --   --  50 49 56 52   TRIG  --   --  155* 209* 105 100   ALT  --   --   --  16 18 18   CR  --   --  1.32* 1.16 1.10 1.33   GFRESTIMATED  --   --  64 76 81 65   POTASSIUM  --   --  4.84 4.4 4.0 4.3   TSH 2.64  --   --   --   --   --         BP Readings from Last 3 Encounters:   12/17/20 118/80   10/31/19 130/87   09/10/19 128/78    Wt Readings from Last 3 Encounters:   12/17/20 96.7 kg (213 lb 3.2 oz)   09/10/19 94.8 kg (209 lb)   07/30/18 94.3 kg (208 lb)              Objective    /80 (BP Location: Right arm, Patient Position: Sitting, Cuff Size: Adult Large)   Pulse 69   Temp 97.9  F (36.6  C) (Oral)   Ht 1.689 m (5' 6.5\")   Wt 96.7 kg (213 lb 3.2 oz)   SpO2 96%   BMI 33.90 kg/m    Body mass index is 33.9 kg/m .  Physical Exam     GENERAL: healthy, alert and no distress  Head: Normocephalic, atraumatic..  RESP: lungs clear to auscultation - no rales, rhonchi or wheezes  CV: regular rate and rhythm, normal S1 S2, no S3 or S4, no murmur, click or rub, no peripheral edema and peripheral pulses strong      Mental Status Exam: "   Appearance: calm  Grooming: adequately groomed  Demeanor: engaged, cooperative  Affect: normal  Speech: Normal.  Gait:Normal.  Movements: Normal  Form of thought: Logical, Linear and Goal directed  Thought content:  Normal  Insight:Good   Judgment: Good   Cognition: Good             Assessment & Plan   1. Major depressive disorder, recurrent episode, moderate (H)  Inc dose of Lexapro, I will call pt 1 month to check in  - buPROPion (WELLBUTRIN XL) 150 MG 24 hr tablet; Take 1 tablet (150 mg) by mouth every morning  Dispense: 90 tablet; Refill: 3  - escitalopram (LEXAPRO) 20 MG tablet; Take 1 tablet (20 mg) by mouth daily Take with 10 mg tablet  Dispense: 90 tablet; Refill: 3  - escitalopram (LEXAPRO) 10 MG tablet; Take 1 tablet (10 mg) by mouth daily Take with 20 mg tablet  Dispense: 90 tablet; Refill: 3    2. Personal history of tobacco use, presenting hazards to health  I will call pt 1 month to check in  - varenicline (CHANTIX AMARA) 0.5 MG X 11 & 1 MG X 42 tablet; Take 0.5 mg tab daily for 3 days, THEN 0.5 mg tab twice daily for 4 days, THEN 1 mg twice daily.  Dispense: 53 tablet; Refill: 0  - [place] XRAY CHEST, 2 VIEWS    3. Encounter for  health examination    - [place] XRAY CHEST, 2 VIEWS    4. Screening for colon cancer    - GASTROENTEROLOGY ADULT REF PROCEDURE ONLY; Future    5. Obesity (BMI 30-39.9)      6. DANY (obstructive sleep apnea)         Tobacco Cessation:   reports that he quit smoking about 10 years ago. His smoking use included cigarettes. He has a 15.00 pack-year smoking history. His smokeless tobacco use includes chew.  Tobacco Cessation Action Plan: Pharmacotherapies : Chantix         FUTURE APPOINTMENTS:  1 year fasting CPE    SOTO Schumacher  [facility]

## 2020-12-17 NOTE — NURSING NOTE
Andrey is here for a nonfasting med check.    Pre-Visit Screening:  Immunizations:UTd  Colonoscopy:NA  Mammogram:NA  Asthma Action Test/Plan:NA  PHQ9:today  GAD7:today  Questioned patient about current smoking habits Pt.former smoker  OK to leave a detailed message on voice mail for today's visit yes, phone # [phone]

## 2020-12-18 ASSESSMENT — ANXIETY QUESTIONNAIRES: GAD7 TOTAL SCORE: 6

## 2021-01-18 ENCOUNTER — MYC MEDICAL ADVICE (OUTPATIENT)
Dept: FAMILY MEDICINE | Facility: CLINIC | Age: 51
End: 2021-01-18

## 2021-09-25 ENCOUNTER — HEALTH MAINTENANCE LETTER (OUTPATIENT)
Age: 51
End: 2021-09-25

## 2021-11-19 ENCOUNTER — TRANSFERRED RECORDS (OUTPATIENT)
Dept: FAMILY MEDICINE | Facility: CLINIC | Age: 51
End: 2021-11-19

## 2022-01-15 ENCOUNTER — HEALTH MAINTENANCE LETTER (OUTPATIENT)
Age: 52
End: 2022-01-15

## 2022-02-01 ENCOUNTER — OFFICE VISIT (OUTPATIENT)
Dept: FAMILY MEDICINE | Facility: CLINIC | Age: 52
End: 2022-02-01

## 2022-02-01 VITALS
TEMPERATURE: 98.1 F | DIASTOLIC BLOOD PRESSURE: 80 MMHG | HEART RATE: 73 BPM | HEIGHT: 67 IN | SYSTOLIC BLOOD PRESSURE: 120 MMHG | WEIGHT: 210 LBS | BODY MASS INDEX: 32.96 KG/M2 | OXYGEN SATURATION: 96 %

## 2022-02-01 DIAGNOSIS — Z12.11 SPECIAL SCREENING FOR MALIGNANT NEOPLASMS, COLON: ICD-10-CM

## 2022-02-01 DIAGNOSIS — Z23 NEED FOR VACCINATION: ICD-10-CM

## 2022-02-01 DIAGNOSIS — F33.1 MAJOR DEPRESSIVE DISORDER, RECURRENT EPISODE, MODERATE (H): ICD-10-CM

## 2022-02-01 DIAGNOSIS — R10.11 RUQ ABDOMINAL PAIN: Primary | ICD-10-CM

## 2022-02-01 LAB
ALBUMIN SERPL-MCNC: 4.6 G/DL (ref 3.6–5.1)
ALP SERPL-CCNC: 68 U/L (ref 33–130)
ALT 1742-6: 19 U/L (ref 0–32)
AST 1920-8: 22 U/L (ref 0–35)
BILIRUB SERPL-MCNC: 0.9 MG/DL (ref 0.2–1.2)
BILIRUBIN DIRECT: 0.4 MG/DL (ref 0.1–0.4)
ERYTHROCYTE [DISTWIDTH] IN BLOOD BY AUTOMATED COUNT: 12.2 %
HCT VFR BLD AUTO: 48.8 % (ref 40–53)
HEMOGLOBIN: 16.6 G/DL (ref 13.3–17.7)
MCH RBC QN AUTO: 33 PG (ref 26–33)
MCHC RBC AUTO-ENTMCNC: 34 G/DL (ref 31–36)
MCV RBC AUTO: 97 FL (ref 78–100)
PLATELET COUNT - QUEST: 219 10^9/L (ref 150–375)
PROT SERPL-MCNC: 7.2 G/DL (ref 6.1–8.1)
RBC # BLD AUTO: 5.03 10*12/L (ref 4.4–5.9)
WBC # BLD AUTO: 4.4 10*9/L (ref 4–11)

## 2022-02-01 PROCEDURE — 36415 COLL VENOUS BLD VENIPUNCTURE: CPT | Performed by: PHYSICIAN ASSISTANT

## 2022-02-01 PROCEDURE — 83690 ASSAY OF LIPASE: CPT | Mod: 90 | Performed by: PHYSICIAN ASSISTANT

## 2022-02-01 PROCEDURE — 99213 OFFICE O/P EST LOW 20 MIN: CPT | Mod: 25 | Performed by: PHYSICIAN ASSISTANT

## 2022-02-01 PROCEDURE — 90686 IIV4 VACC NO PRSV 0.5 ML IM: CPT | Performed by: PHYSICIAN ASSISTANT

## 2022-02-01 PROCEDURE — 85027 COMPLETE CBC AUTOMATED: CPT | Performed by: PHYSICIAN ASSISTANT

## 2022-02-01 PROCEDURE — 90471 IMMUNIZATION ADMIN: CPT | Performed by: PHYSICIAN ASSISTANT

## 2022-02-01 PROCEDURE — 80076 HEPATIC FUNCTION PANEL: CPT | Performed by: PHYSICIAN ASSISTANT

## 2022-02-01 ASSESSMENT — MIFFLIN-ST. JEOR: SCORE: 1768.24

## 2022-02-01 NOTE — NURSING NOTE
Chief Complaint   Patient presents with     Abdominal Pain     started 2 weeks, right upper side, pressure, doesn't matter time of day or if doing anything      Pre-Visit Screening:  Immunizations:flu shot   Colonoscopy:Needs  Mammogram:Na  Asthma Action Test/Plan:NA  PHQ9:Na  GAD7:NA  Questioned patient about current smoking habits Pt.former  OK to leave a detailed message on voice mail for today's visit yes, phone # [phone]   ACP dicussed

## 2022-02-01 NOTE — PROGRESS NOTES
"Assessment & Plan     RUQ abdominal pain  Labs pending, ultrasound ordered  Consider CT  - Hemogram with Platelets ([facility])  - VENOUS COLLECTION  - Hepatic Panel ([facility])  - Lipase ([facility])  - Radiology Referral  - US Abdomen Complete    Special screening for malignant neoplasms, colon    - Adult Gastro Ref - Procedure Only    Need for vaccination    - FLU VAC PRESRV FREE QUAD SPLIT VIR 3+YRS IM    Major depressive disorder, recurrent episode, moderate (H)  Psych managed      SOTO Schumacher  [facility]    Subjective     Nursing Notes:   Leslie Latham CMA  2/1/2022  8:55 AM  Signed  Chief Complaint   Patient presents with     Abdominal Pain     started 2 weeks, right upper side, pressure, doesn't matter time of day or if doing anything      Pre-Visit Screening:  Immunizations:flu shot   Colonoscopy:Needs  Mammogram:Na  Asthma Action Test/Plan:NA  PHQ9:Na  GAD7:NA  Questioned patient about current smoking habits Pt.former  OK to leave a detailed message on voice mail for today's visit yes, phone # [phone]   ACP dicussed               Andrey Parks is a 51 year old male who presents to clinic today for the following health issues:     HPI       Here with RUQ pain that started 2 weeks ago. Feels like a pressure sensation. Cramping sensation.  Described as mild discomfort.  Not changed with eating.  No GERD.  Taking Metamucil daily.  No other change in bowel habits.   Denies nausea.   Has been doing rowing as workout - doesn't make it worse.     Alcohol - 4 drink several times a week.    Seeing psychiatrist now for mental health  ACP   Seeing therapist at [facility] for PTSD      OTC: nothing    Family hx: Paternal uncle with pancreatic cancer mid 50s.           Objective    /80 (BP Location: Right arm, Patient Position: Sitting, Cuff Size: Adult Large)   Pulse 73   Temp 98.1  F (36.7  C) (Oral)   Ht 1.689 m (5' 6.5\")   Wt 95.3 kg (210 lb)   SpO2 96%   BMI 33.39 kg/m    Body " mass index is 33.39 kg/m .  Physical Exam   GENERAL: healthy, alert and no distress  RESP: lungs clear to auscultation - no rales, rhonchi or wheezes  CV: regular rate and rhythm, normal S1 S2, no S3 or S4, no murmur, click or rub, no peripheral edema and peripheral pulses strong  Abdomen: Normal bowel sounds. Soft, no masses appreciated, no organomegaly. Non-tender. No guarding, no rebound tenderness.   MS: no gross musculoskeletal defects noted, no edema    Mental Status Exam:   Appearance: calm  Grooming: adequately groomed  Demeanor: engaged, cooperative  Affect: normal  Speech: Normal.  Gait:Normal.  Movements: Normal  Form of thought: Logical, Linear and Goal directed  Thought content:  Normal  Insight:Good   Judgment: Good   Cognition: Good

## 2022-02-02 ENCOUNTER — TELEPHONE (OUTPATIENT)
Dept: FAMILY MEDICINE | Facility: CLINIC | Age: 52
End: 2022-02-02

## 2022-02-02 PROBLEM — K76.0 FATTY LIVER: Status: ACTIVE | Noted: 2022-02-02

## 2022-02-02 LAB — LIPASE SERPL-CCNC: 20 U/L (ref 7–60)

## 2022-02-02 NOTE — TELEPHONE ENCOUNTER
Called to review ultrasound    Your ultrasound showed Fatty liver  This means fat is in the liver - this can be improved with weight loss    Gas seen - advised MOM  See me 1 week recheck if not improving    LM on cell         Bethany Monge PA-C  2/2/2022

## 2022-04-27 ENCOUNTER — TRANSFERRED RECORDS (OUTPATIENT)
Dept: FAMILY MEDICINE | Facility: CLINIC | Age: 52
End: 2022-04-27

## 2022-09-07 ENCOUNTER — APPOINTMENT (OUTPATIENT)
Dept: GENERAL RADIOLOGY | Facility: CLINIC | Age: 52
End: 2022-09-07
Attending: EMERGENCY MEDICINE
Payer: OTHER MISCELLANEOUS

## 2022-09-07 ENCOUNTER — HOSPITAL ENCOUNTER (EMERGENCY)
Facility: CLINIC | Age: 52
Discharge: HOME | End: 2022-09-07
Attending: PHYSICIAN ASSISTANT | Admitting: PHYSICIAN ASSISTANT
Payer: OTHER MISCELLANEOUS

## 2022-09-07 VITALS
OXYGEN SATURATION: 97 % | TEMPERATURE: 98.1 F | RESPIRATION RATE: 20 BRPM | HEART RATE: 79 BPM | DIASTOLIC BLOOD PRESSURE: 80 MMHG | SYSTOLIC BLOOD PRESSURE: 170 MMHG

## 2022-09-07 DIAGNOSIS — S49.91XA SHOULDER INJURY, RIGHT, INITIAL ENCOUNTER: ICD-10-CM

## 2022-09-07 PROCEDURE — 73030 X-RAY EXAM OF SHOULDER: CPT | Mod: RT

## 2022-09-07 PROCEDURE — 250N000013 HC RX MED GY IP 250 OP 250 PS 637: Performed by: EMERGENCY MEDICINE

## 2022-09-07 PROCEDURE — 99283 EMERGENCY DEPT VISIT LOW MDM: CPT

## 2022-09-07 RX ORDER — IBUPROFEN 600 MG/1
600 TABLET, FILM COATED ORAL ONCE
Status: COMPLETED | OUTPATIENT
Start: 2022-09-07 | End: 2022-09-07

## 2022-09-07 RX ORDER — ACETAMINOPHEN 325 MG/1
975 TABLET ORAL ONCE
Status: COMPLETED | OUTPATIENT
Start: 2022-09-07 | End: 2022-09-07

## 2022-09-07 RX ADMIN — ACETAMINOPHEN 975 MG: 325 TABLET, FILM COATED ORAL at 17:41

## 2022-09-07 RX ADMIN — IBUPROFEN 600 MG: 600 TABLET ORAL at 17:41

## 2022-09-07 ASSESSMENT — ACTIVITIES OF DAILY LIVING (ADL)
ADLS_ACUITY_SCORE: 35
ADLS_ACUITY_SCORE: 35

## 2022-09-07 NOTE — ED TRIAGE NOTES
Pt presents after falling down stairs on right shoulder. Denies hitting head. CMS intact to RUE.     Triage Assessment     Row Name 09/07/22 1524       Triage Assessment (Adult)    Airway WDL WDL       Cognitive/Neuro/Behavioral WDL    Cognitive/Neuro/Behavioral WDL WDL

## 2022-09-07 NOTE — ED PROVIDER NOTES
History     Chief Complaint:  Shoulder Injury      HPI   Andrey Parks is a 51 year old male who presents with right shoulder injury.  He slipped on the stairs while in training wearing his gear.  He thinks he slipped on about 4-5 stairs landing on his right shoulder and hyperextending his arm.  He denies hitting his head neck chest or abdomen.  He did bruise his right thigh but is been able to walk without difficulty.  He is not on blood thinners.  No loss of consciousness vomiting or shortness of breath.  No numbness in the fingers or hand.    Review of Systems   All other systems reviewed and are negative.    Allergies:  Aspirin [Dihydroxyaluminum Aminoacetate]      Medications:    buPROPion (WELLBUTRIN XL) 150 MG 24 hr tablet  escitalopram (LEXAPRO) 10 MG tablet  escitalopram (LEXAPRO) 20 MG tablet  FISH OIL  Multiple Vitamin (MULTI-DAY) TABS  varenicline (CHANTIX AMARA) 0.5 MG X 11 & 1 MG X 42 tablet        Past Medical History:    Past Medical History:   Diagnosis Date     Anxiety      Chronic infection      Depressive disorder      Lyme disease 2012     Onychomycosis      Toe fracture, right 2019     Patient Active Problem List    Diagnosis Date Noted     Fatty liver 02/02/2022     Priority: Medium     Osteoarthritis of both feet, unspecified osteoarthritis type 09/12/2019     Priority: Medium     Asymptomatic varicose veins of right lower extremity 09/12/2019     Priority: Medium     Rectal bleeding 09/12/2019     Priority: Medium     Oral herpes simplex infection 09/12/2019     Priority: Medium     Encounter for  health examination 09/09/2019     Priority: Medium     Obesity (BMI 30-39.9) 07/30/2018     Priority: Medium     Personal history of tobacco use, presenting hazards to health -chewing 07/30/2018     Priority: Medium     Skin neoplasm - upper left back 3 mm erythematous macule 2017 03/02/2017     Priority: Medium     DANY (obstructive sleep apnea) 08/12/2014     Priority: Medium     2011  sleep study at [facility]  Per medical records transferred       Health Care Home 06/03/2014     Priority: Medium     State Tier Level:  Tier 1  Status:  NA  Care Coordinator:      See Letters for [facility] Care Plan  Date:  August 13, 2014           ACP (advance care planning) 06/03/2014     Priority: Medium     Advance Care Planning:   ACP Review and Resources Provided:  Reviewed chart for advance care plan.  Andrey Parks has no plan or code status on file. Discussed available resources and provided with information. Confirmed code status reflects current choices pending further ACP discussions.  Confirmed/documented designated decision maker(s). See permanent comments section of demographics in clinical tab.   Added by Abby Parra on 6/3/2014             Major depressive disorder, recurrent episode, moderate (H) 03/20/2008     Priority: Medium     Overview:   Rule out PTSD and Adult ADHD which are likely comorbid problems.          Past Surgical History:    Past Surgical History:   Procedure Laterality Date     ARTHROSCOPY KNEE      right knee     COLONOSCOPY  2013    normal     ORTHOPEDIC SURGERY Right 1988    right ankle surgery     refractive surgery  2010    Lasix     SEPTOPLASTY, TURBINOPLASTY, COMBINED  11/2/2011    Procedure:COMBINED SEPTOPLASTY, TURBINOPLASTY; COMBINED SEPTOPLASTY  with Turbinate Reduction ; Surgeon:JACQUELINE KIRAN; Location:[facility] OR     TONSILLECTOMY      childhood       Family History:    Family History   Problem Relation Age of Onset     Cancer Mother 70        possible colon or ovarian that metastasized     Alcohol/Drug Mother      Depression Mother      Alcohol/Drug Father      Seizure Disorder Father      No Known Problems Brother      Other - See Comments Son         ? learning disability     Depression Brother      Other - See Comments Brother         ADHD     Depression Brother      Other - See Comments Son         autism spectrum     Depression Son      Cancer Paternal Grandfather          prostate     No Known Problems Sister      Other - See Comments Paternal Grandmother         stroke       Social History:  Employed as a .  Bethany Monge  The patient presents to the ED with self    Physical Exam     Patient Vitals for the past 24 hrs:   BP Temp Temp src Pulse Resp SpO2   09/07/22 1525 (!) 170/80 98.1  F (36.7  C) Temporal 79 20 97 %       Physical Exam  General: Well appearing.  Non-toxic.  Ambulatory    Head:  Atraumatic, external ears and nose normal.    Neck:  Normal range of motion without rigidity.    CV:  Regular rate and rhythm    No pathologic murmur, rubs, or gallops.    Resp:  Breath sounds are clear bilaterally.    Non-labored, no retractions or accessory muscle use.     Abdomen: Soft, no ttp or bruising.    MS:  Normal active and passive range of motion of the shoulder.  Able to fully externally rotate.  No gross deformity.    There is no tenderness or deformity of sternoclavicular joint, clavicle, AC joint.    Normal hook test of distal biceps tendon. No palpable deformity of tendon.    PROM of elbow without pain.    No midline spinal tenderness.  No tenderness to palpation over the ribs, sternum, scapula.      Neuro: Normal strength and sensation at elbows, and radial, median, ulnar nerve distributions of hands Bl.    Skin:  2+ brachial and radial pulses.  Normal cap refill.  No rashes, fluctuance, or crepitance.    Psych: Alert, oriented.  Appropriate interactions.      Emergency Department Course   Imaging:  XR Shoulder Right G/E 3 Views   Final Result   IMPRESSION: No fracture or dislocation. Mild degenerative changes at   the glenohumeral joint, with moderate subchondral cystic change in the   inferior portion of the glenoid.      CAPO RECIO MD            SYSTEM ID:  [id number]        Report per radiology    Emergency Department Course:  Reviewed:  I reviewed nursing notes, vitals, past medical history, Care Everywhere and  [facility]    Assessments:  1620 I obtained history and examined the patient as noted above.     Interventions:  Medications - No data to display    Disposition:  The patient was discharged to home.     Impression & Plan    Medical Decision Makin-year-old male presents after mechanical fall with right shoulder pain.  X-rays negative for fracture or dislocation.  Head to toe trauma exam otherwise nonconcerning.  CMS intact.  Nothing to suggest occult posterior dislocation/subluxation, biceps tendon rupture or injury to the joint above or below.    Plan will be for PRICE, sling for comfort with instruction for early mobilization to avoid frozen shoulder Ortho follow-up if not improving to further evaluate for ligamentous injury.  Return precautions given for CMS compromise severe pain etc.  Critical Care time:  none    Addendum: Dr. Drew speaking to patient.  He will order MRI to better eval injury.  Please see his note for details.    Covid-19  Andrey Parks was evaluated during a global COVID-19 pandemic, which necessitated consideration that the patient might be at risk for infection with the SARS-CoV-2 virus that causes COVID-19.   Applicable protocols for evaluation were followed during the patient's care.   COVID-19 was considered as part of the patient's evaluation.    Diagnosis:    ICD-10-CM    1. Shoulder injury, right, initial encounter  S49.91XA        Discharge Medications:  New Prescriptions    No medications on file         Scribe Disclosure:  Lavelle GIRALDO PA-C, am serving as a scribe at 4:05 PM on 2022 to document services personally performed by based on my observations and the provider's statements to me.      Lavelle Jansen PA-C  22 [id number]       Lavelle Jansen PA-C  22 [id number]

## 2022-09-07 NOTE — LETTER
Andrey Parks was seen and treated in our emergency department on 9/7/2022.  He may return to work on 09/10/2022.    If you have any questions or concerns, please don't hesitate to call.

## 2022-09-08 ENCOUNTER — TRANSFERRED RECORDS (OUTPATIENT)
Dept: FAMILY MEDICINE | Facility: CLINIC | Age: 52
End: 2022-09-08

## 2022-09-19 ENCOUNTER — TRANSFERRED RECORDS (OUTPATIENT)
Dept: FAMILY MEDICINE | Facility: CLINIC | Age: 52
End: 2022-09-19

## 2022-10-03 ENCOUNTER — TRANSFERRED RECORDS (OUTPATIENT)
Dept: FAMILY MEDICINE | Facility: CLINIC | Age: 52
End: 2022-10-03

## 2022-12-26 ENCOUNTER — HEALTH MAINTENANCE LETTER (OUTPATIENT)
Age: 52
End: 2022-12-26

## 2023-04-13 ENCOUNTER — OFFICE VISIT (OUTPATIENT)
Dept: FAMILY MEDICINE | Facility: CLINIC | Age: 53
End: 2023-04-13

## 2023-04-13 VITALS
SYSTOLIC BLOOD PRESSURE: 120 MMHG | WEIGHT: 188 LBS | BODY MASS INDEX: 29.51 KG/M2 | HEIGHT: 67 IN | RESPIRATION RATE: 20 BRPM | HEART RATE: 82 BPM | TEMPERATURE: 98 F | OXYGEN SATURATION: 98 % | DIASTOLIC BLOOD PRESSURE: 82 MMHG

## 2023-04-13 DIAGNOSIS — Z13.29 SCREENING FOR ENDOCRINE, METABOLIC AND IMMUNITY DISORDER: ICD-10-CM

## 2023-04-13 DIAGNOSIS — Z12.5 SCREENING FOR PROSTATE CANCER: ICD-10-CM

## 2023-04-13 DIAGNOSIS — Z02.89 ENCOUNTER FOR FIREFIGHTER HEALTH EXAMINATION: Primary | ICD-10-CM

## 2023-04-13 DIAGNOSIS — Z13.220 SCREENING FOR LIPOID DISORDERS: ICD-10-CM

## 2023-04-13 DIAGNOSIS — Z13.228 SCREENING FOR ENDOCRINE, METABOLIC AND IMMUNITY DISORDER: ICD-10-CM

## 2023-04-13 DIAGNOSIS — Z00.00 ROUTINE GENERAL MEDICAL EXAMINATION AT A HEALTH CARE FACILITY: ICD-10-CM

## 2023-04-13 DIAGNOSIS — Z13.0 SCREENING FOR ENDOCRINE, METABOLIC AND IMMUNITY DISORDER: ICD-10-CM

## 2023-04-13 LAB
% GRANULOCYTES: 50.9 %
ALBUMIN SERPL-MCNC: 4.6 G/DL (ref 3.6–5.1)
ALBUMIN/GLOB SERPL: 2 {RATIO} (ref 1–2.5)
ALP SERPL-CCNC: 81 U/L (ref 33–130)
ALT 1742-6: 12 U/L (ref 0–32)
APPEARANCE UR: CLEAR
AST 1920-8: 22 U/L (ref 0–35)
BACTERIA, UR: NORMAL
BILIRUB SERPL-MCNC: 0.9 MG/DL (ref 0.2–1.2)
BILIRUB UR QL: NORMAL
BUN SERPL-MCNC: 11 MG/DL (ref 7–25)
BUN/CREATININE RATIO: 10.5 (ref 6–22)
CALCIUM SERPL-MCNC: 9.7 MG/DL (ref 8.6–10.3)
CASTS/LPF: NORMAL
CHLORIDE SERPLBLD-SCNC: 106.3 MMOL/L (ref 98–110)
CHOLEST SERPL-MCNC: 198 MG/DL (ref 0–199)
CHOLEST/HDLC SERPL: 4 {RATIO} (ref 0–5)
CO2 SERPL-SCNC: 24.4 MMOL/L (ref 20–32)
COLOR UR: YELLOW
CREAT SERPL-MCNC: 1.05 MG/DL (ref 0.6–1.3)
EP/HPF: NORMAL
GLOBULIN, CALCULATED - QUEST: 2.3 (ref 1.9–3.7)
GLUCOSE SERPL-MCNC: 95 MG/DL (ref 60–99)
GLUCOSE URINE: NORMAL MG/DL
HBA1C MFR BLD: 5.2 % (ref 4–7)
HCT VFR BLD AUTO: 48.3 % (ref 40–53)
HDLC SERPL-MCNC: 56 MG/DL (ref 40–150)
HEMOGLOBIN: 15.8 G/DL (ref 13.3–17.7)
HGB UR QL: NORMAL
KETONES UR QL: NORMAL MG/DL
LDLC SERPL CALC-MCNC: 107 MG/DL (ref 0–130)
LYMPHOCYTES NFR BLD AUTO: 39.1 %
MCH RBC QN AUTO: 33.3 PG (ref 26–33)
MCHC RBC AUTO-ENTMCNC: 32.7 G/DL (ref 31–36)
MCV RBC AUTO: 102 FL (ref 78–100)
MISC.: NORMAL
MONOCYTES NFR BLD AUTO: 10 %
NITRITE UR QL STRIP: NORMAL
PH UR STRIP: 6.5 PH (ref 5–7)
PLATELET COUNT - QUEST: 208 10^9/L (ref 150–375)
POTASSIUM SERPL-SCNC: 4.33 MMOL/L (ref 3.5–5.3)
PROT SERPL-MCNC: 6.9 G/DL (ref 6.1–8.1)
PROT UR QL: NORMAL MG/DL
RBC # BLD AUTO: 4.74 10*12/L (ref 4.4–5.9)
RBC, UR MICRO: NORMAL (ref ?–2)
SODIUM SERPL-SCNC: 139.7 MMOL/L (ref 135–146)
SP GR UR STRIP: 1.02 (ref 1–1.03)
TRIGL SERPL-MCNC: 176 MG/DL (ref 0–149)
UROBILINOGEN UR QL STRIP: 0.2 EU/DL (ref 0.2–1)
WBC # BLD AUTO: 4.4 10*9/L (ref 4–11)
WBC #/AREA URNS HPF: NORMAL /[HPF]
WBC, UR MICRO: NORMAL (ref ?–2)

## 2023-04-13 PROCEDURE — 85025 COMPLETE CBC W/AUTO DIFF WBC: CPT | Performed by: PHYSICIAN ASSISTANT

## 2023-04-13 PROCEDURE — 84153 ASSAY OF PSA TOTAL: CPT | Mod: 90 | Performed by: PHYSICIAN ASSISTANT

## 2023-04-13 PROCEDURE — 99396 PREV VISIT EST AGE 40-64: CPT | Performed by: PHYSICIAN ASSISTANT

## 2023-04-13 PROCEDURE — 80061 LIPID PANEL: CPT | Performed by: PHYSICIAN ASSISTANT

## 2023-04-13 PROCEDURE — 36415 COLL VENOUS BLD VENIPUNCTURE: CPT | Performed by: PHYSICIAN ASSISTANT

## 2023-04-13 PROCEDURE — 83036 HEMOGLOBIN GLYCOSYLATED A1C: CPT | Performed by: PHYSICIAN ASSISTANT

## 2023-04-13 PROCEDURE — 80053 COMPREHEN METABOLIC PANEL: CPT | Performed by: PHYSICIAN ASSISTANT

## 2023-04-13 PROCEDURE — 93000 ELECTROCARDIOGRAM COMPLETE: CPT | Performed by: PHYSICIAN ASSISTANT

## 2023-04-13 PROCEDURE — 84443 ASSAY THYROID STIM HORMONE: CPT | Mod: 90 | Performed by: PHYSICIAN ASSISTANT

## 2023-04-13 PROCEDURE — 81001 URINALYSIS AUTO W/SCOPE: CPT | Performed by: PHYSICIAN ASSISTANT

## 2023-04-13 ASSESSMENT — PATIENT HEALTH QUESTIONNAIRE - PHQ9
5. POOR APPETITE OR OVEREATING: SEVERAL DAYS
SUM OF ALL RESPONSES TO PHQ QUESTIONS 1-9: 15

## 2023-04-13 ASSESSMENT — ANXIETY QUESTIONNAIRES
GAD7 TOTAL SCORE: 9
6. BECOMING EASILY ANNOYED OR IRRITABLE: MORE THAN HALF THE DAYS
GAD7 TOTAL SCORE: 9
1. FEELING NERVOUS, ANXIOUS, OR ON EDGE: SEVERAL DAYS
3. WORRYING TOO MUCH ABOUT DIFFERENT THINGS: MORE THAN HALF THE DAYS
2. NOT BEING ABLE TO STOP OR CONTROL WORRYING: SEVERAL DAYS
5. BEING SO RESTLESS THAT IT IS HARD TO SIT STILL: SEVERAL DAYS
7. FEELING AFRAID AS IF SOMETHING AWFUL MIGHT HAPPEN: SEVERAL DAYS
IF YOU CHECKED OFF ANY PROBLEMS ON THIS QUESTIONNAIRE, HOW DIFFICULT HAVE THESE PROBLEMS MADE IT FOR YOU TO DO YOUR WORK, TAKE CARE OF THINGS AT HOME, OR GET ALONG WITH OTHER PEOPLE: SOMEWHAT DIFFICULT

## 2023-04-13 NOTE — LETTER
My Depression Action Plan  Name: Andrey Parks   Date of Birth 1970  Date: 4/13/2023    My doctor: Charles Pimentel   My clinic: [facility]  [address]  [address]  [place] [id number] [phone]            GREEN    ZONE   Good Control    What it looks like:     Things are going generally well. You have normal ups and downs. You may even feel depressed from time to time, but bad moods usually last less than a day.   What you need to do:  1. Continue to care for yourself (see self care plan)  2. Check your depression survival kit and update it as needed  3. Follow your physician s recommendations including any medication.  4. Do not stop taking medication unless you consult with your physician first.             YELLOW         ZONE Getting Worse    What it looks like:     Depression is starting to interfere with your life.     It may be hard to get out of bed; you may be starting to isolate yourself from others.    Symptoms of depression are starting to last most all day and this has happened for several days.     You may have suicidal thoughts but they are not constant.   What you need to do:     1. Call your care team. Your response to treatment will improve if you keep your care team informed of your progress. Yellow periods are signs an adjustment may need to be made.     2. Continue your self-care.  Just get dressed and ready for the day.  Don't give yourself time to talk yourself out of it.    3. Talk to someone in your support network.    4. Open up your Depression Self-Care Plan/Wellness Kit.             RED    ZONE Medical Alert - Get Help    What it looks like:     Depression is seriously interfering with your life.     You may experience these or other symptoms: You can t get out of bed most days, can t work or engage in other necessary activities, you have trouble taking care of basic hygiene, or basic responsibilities, thoughts of suicide or death that will  not go away, self-injurious behavior.     What you need to do:  1. Call your care team and request a same-day appointment. If they are not available (weekends or after hours) call your local crisis line, emergency room or 911.          Depression Self-Care Plan / Wellness Kit    Many people find that medication and therapy are helpful treatments for managing depression. In addition, making small changes to your everyday life can help to boost your mood and improve your wellbeing. Below are some tips for you to consider. Be sure to talk with your medical provider and/or behavioral health consultant if your symptoms are worsening or not improving.     Sleep   Sleep hygiene  means all of the habits that support good, restful sleep. It includes maintaining a consistent bedtime and wake time, using your bedroom only for sleeping or sex, and keeping the bedroom dark and free of distractions like a computer, smartphone, or television.     Develop a Healthy Routine  Maintain good hygiene. Get out of bed in the morning, make your bed, brush your teeth, take a shower, and get dressed. Don t spend too much time viewing media that makes you feel stressed. Find time to relax each day.    Exercise  Get some form of exercise every day. This will help reduce pain and release endorphins, the  feel good  chemicals in your brain. It can be as simple as just going for a walk or doing some gardening, anything that will get you moving.      Diet  Strive to eat healthy foods, including fruits and vegetables. Drink plenty of water. Avoid excessive sugar, caffeine, alcohol, and other mood-altering substances.     Stay Connected with Others  Stay in touch with friends and family members.    Manage Your Mood  Try deep breathing, massage therapy, biofeedback, or meditation. Take part in fun activities when you can. Try to find something to smile about each day.     Psychotherapy  Be open to working with a therapist if your provider recommends  it.     Medication  Be sure to take your medication as prescribed. Most anti-depressants need to be taken every day. It usually takes several weeks for medications to work. Not all medicines work for all people. It is important to follow-up with your provider to make sure you have a treatment plan that is working for you. Do not stop your medication abruptly without first discussing it with your provider.    Crisis Resources   These hotlines are for both adults and children. They and are open 24 hours a day, 7 days a week unless noted otherwise.      National Suicide Prevention Lifeline   988 or 4-082-450-RGGK (0037)      Crisis Text Line    www.crisistextline.org  Text HOME to 966369 from anywhere in the [place], anytime, about any type of crisis. A live, trained crisis counselor will receive the text and respond quickly.      [facility] Lifeline for LGBTQ Youth  A national crisis intervention and suicide lifeline for LGBTQ youth under 25. Provides a safe place to talk without judgement. Call 1-956.613.5922; text START to 732660 or visit www.thetrevorproject.org to talk to a trained counselor.      For [facility] crisis numbers, visit the [facility] website at:  https://[place].gov/dhs/people-we-serve/adults/health-care/mental-health/resources/crisis-contacts.jsp

## 2023-04-13 NOTE — PROGRESS NOTES
SUBJECTIVE:   CC: Ishmael is an 52 year old who presents for preventative health visit.        View : No data to display.              Patient has been advised of split billing requirements and indicates understanding: Yes  HPI      Today's PHQ-2 Score:        View : No data to display.              Diet-vegan, good diet  Exercise-active with work, works out as well  Sleep-poor right now due to some PTSD symptoms, harder to fall asleep. Has CPAP-will call if he needs a referral to see specialist  BM-no concerns-better with diet  Vitamin Use-B12, due to veganism  Dentist-will make appt, last saw a few years ago  Eye Doctor-yearly  Colon-due   PSA-ordered today    Getting help at work for PTSD-sees psychiatrist, managing through this MD    Social History     Tobacco Use     Smoking status: Every Day     Packs/day: 0.50     Years: 20.00     Pack years: 10.00     Types: Cigarettes     Last attempt to quit: 6/3/2010     Years since quittin.8     Smokeless tobacco: Former     Types: Chew   Vaping Use     Vaping status: Not on file   Substance Use Topics     Alcohol use: Yes     Alcohol/week: 4.0 standard drinks of alcohol     Types: 4 Standard drinks or equivalent per week              View : No data to display.                Last PSA:   [facility] PSA   Date Value Ref Range Status   09/10/2019 1.2 < OR = 4.0 ng/mL Final     Comment:     The total PSA value from this assay system is   standardized against the WHO standard. The test   result will be approximately 20% lower when compared   to the equimolar-standardized total PSA (Andrea   Nixon). Comparison of serial PSA results should be   interpreted with this fact in mind.     This test was performed using the Siemens   chemiluminescent method. Values obtained from   different assay methods cannot be used  interchangeably. PSA levels, regardless of  value, should not be interpreted as absolute  evidence of the presence or absence of disease.         Reviewed  "orders with patient. Reviewed health maintenance and updated orders accordingly - Yes  Lab work is in process    Reviewed and updated as needed this visit by clinical staff   Tobacco  Allergies  Meds              Reviewed and updated as needed this visit by Provider    Allergies              Past Medical History:   Diagnosis Date     Anxiety      Chronic infection     Lymes disease 2011, 1997     Depressive disorder      Lyme disease 2012    hearing loss right ear     Onychomycosis     left toe - treated with PO Lamisil     Toe fracture, right 2019    podagra      Past Surgical History:   Procedure Laterality Date     ARTHROSCOPY KNEE      right knee     COLONOSCOPY  2013    normal     ORTHOPEDIC SURGERY Right 1988    right ankle surgery     refractive surgery  2010    Lasix     SEPTOPLASTY, TURBINOPLASTY, COMBINED  11/2/2011    Procedure:COMBINED SEPTOPLASTY, TURBINOPLASTY; COMBINED SEPTOPLASTY  with Turbinate Reduction ; Surgeon:JACQUELINE KIRAN; Location:[facility] OR     TONSILLECTOMY      childhood       Review of Systems  CONSTITUTIONAL: NEGATIVE for fever, chills, change in weight  INTEGUMENTARY/SKIN: NEGATIVE for worrisome rashes, moles or lesions  EYES: NEGATIVE for vision changes or irritation  ENT: NEGATIVE for ear, mouth and throat problems  RESP: NEGATIVE for significant cough or SOB  CV: NEGATIVE for chest pain, palpitations or peripheral edema  GI: NEGATIVE for nausea, abdominal pain, heartburn, or change in bowel habits   male: negative for dysuria, hematuria, decreased urinary stream, erectile dysfunction, urethral discharge  MUSCULOSKELETAL: NEGATIVE for significant arthralgias or myalgia  NEURO: NEGATIVE for weakness, dizziness or paresthesias  PSYCHIATRIC: NEGATIVE for changes in mood or affect    OBJECTIVE:   /82 (BP Location: Left arm, Patient Position: Sitting, Cuff Size: Adult Large)   Pulse 82   Temp 98  F (36.7  C) (Temporal)   Resp 20   Ht 1.702 m (5' 7\")   Wt 85.3 kg (188 lb)   " SpO2 98%   BMI 29.44 kg/m      Physical Exam  GENERAL: healthy, alert and no distress  EYES: Eyes grossly normal to inspection, PERRL and conjunctivae and sclerae normal  HENT: ear canals and TM's normal, nose and mouth without ulcers or lesions  NECK: no adenopathy, no asymmetry, masses, or scars and thyroid normal to palpation  RESP: lungs clear to auscultation - no rales, rhonchi or wheezes  CV: regular rate and rhythm, normal S1 S2, no S3 or S4, no murmur, click or rub, no peripheral edema and peripheral pulses strong  ABDOMEN: soft, nontender, no hepatosplenomegaly, no masses and bowel sounds normal  MS: no gross musculoskeletal defects noted, no edema  SKIN: no suspicious lesions or rashes  NEURO: Normal strength and tone, mentation intact and speech normal  PSYCH: mentation appears normal, affect normal/bright    Diagnostic Test Results:  Labs reviewed in Epic    EKG: SR, normal axis and intervals, unchanged from previous tracings    ASSESSMENT/PLAN:       ICD-10-CM    1. Encounter for  health examination  Z02.89 Comprehensive Metobolic Panel ([facility])     Lipid Panel ([facility])     TSH ([facility])     URINALYSIS, ROUTINE ([facility])     HEMOGLOBIN A1C ([facility])     HEMOGRAM PLATELET DIFF ([facility])     EKG 12-lead complete w/read - Clinics     VENOUS COLLECTION      2. Routine general medical examination at a health care facility  Z00.00 Comprehensive Metobolic Panel ([facility])     Lipid Panel ([facility])     TSH ([facility])     URINALYSIS, ROUTINE ([facility])     HEMOGLOBIN A1C ([facility])     HEMOGRAM PLATELET DIFF ([facility])     EKG 12-lead complete w/read - Clinics     VENOUS COLLECTION     PSA Total ([facility])      3. Screening for lipoid disorders  Z13.220 VENOUS COLLECTION      4. Screening for endocrine, metabolic and immunity disorder  Z13.29 Comprehensive Metobolic Panel ([facility])    Z13.228 TSH ([facility])    Z13.0 HEMOGLOBIN A1C ([facility])      5. Screening for prostate cancer  Z12.5 PSA Total ([facility])          Patient has been advised of split billing  requirements and indicates understanding: Yes      COUNSELING:   Reviewed preventive health counseling, as reflected in patient instructions       Regular exercise       Healthy diet/nutrition       Colorectal cancer screening       Prostate cancer screening        He reports that he has been smoking cigarettes. He has a 10.00 pack-year smoking history. He has quit using smokeless tobacco.  His smokeless tobacco use included chew.            Charles Pimentel PA-C  [facility]

## 2023-04-13 NOTE — NURSING NOTE
Chief Complaint   Patient presents with     Physical     Annual, fasting      Pre-visit Screening:  Immunizations:  up to date  Colonoscopy:  is up to date  Mammogram: NA  Asthma Action Test/Plan:  NA  PHQ9: given today   GAD7:  Given today   Questioned patient about current smoking habits Pt. currently smokes.  Advised about smoking cessation.  Ok to leave detailed message on voice mail for today's visit only Yes, phone # [phone]

## 2023-04-14 LAB
ABBOTT PSA - QUEST: 0.52 NG/ML
TSH SERPL-ACNC: 3.12 MIU/L (ref 0.4–4.5)

## 2023-05-01 ENCOUNTER — TRANSFERRED RECORDS (OUTPATIENT)
Dept: FAMILY MEDICINE | Facility: CLINIC | Age: 53
End: 2023-05-01

## 2023-05-23 ENCOUNTER — TRANSFERRED RECORDS (OUTPATIENT)
Dept: FAMILY MEDICINE | Facility: CLINIC | Age: 53
End: 2023-05-23

## 2023-07-20 ENCOUNTER — OFFICE VISIT (OUTPATIENT)
Dept: URGENT CARE | Facility: URGENT CARE | Age: 53
End: 2023-07-20
Payer: COMMERCIAL

## 2023-07-20 VITALS
TEMPERATURE: 98.4 F | BODY MASS INDEX: 30.38 KG/M2 | OXYGEN SATURATION: 98 % | HEART RATE: 56 BPM | SYSTOLIC BLOOD PRESSURE: 133 MMHG | DIASTOLIC BLOOD PRESSURE: 88 MMHG | WEIGHT: 194 LBS

## 2023-07-20 DIAGNOSIS — L30.9 DERMATITIS: Primary | ICD-10-CM

## 2023-07-20 PROCEDURE — 99213 OFFICE O/P EST LOW 20 MIN: CPT | Performed by: PHYSICIAN ASSISTANT

## 2023-07-20 RX ORDER — TRIAMCINOLONE ACETONIDE 1 MG/G
CREAM TOPICAL 2 TIMES DAILY
Qty: 30 G | Refills: 0 | Status: SHIPPED | OUTPATIENT
Start: 2023-07-20 | End: 2023-08-03

## 2023-07-20 RX ORDER — MUPIROCIN 20 MG/G
OINTMENT TOPICAL 3 TIMES DAILY
Qty: 15 G | Refills: 0 | Status: SHIPPED | OUTPATIENT
Start: 2023-07-20 | End: 2023-07-27

## 2023-07-20 RX ORDER — MELOXICAM 7.5 MG/1
7.5 TABLET ORAL DAILY
COMMUNITY
Start: 2023-07-05 | End: 2023-10-16

## 2023-07-20 RX ORDER — PROPRANOLOL HYDROCHLORIDE 10 MG/1
2 TABLET ORAL
COMMUNITY
Start: 2023-07-11

## 2023-07-20 NOTE — PROGRESS NOTES
URGENT CARE VISIT:    SUBJECTIVE:   HPI:   Andrey Parks is a 52 year old who presents with rash located over left forearm since 3 month(s) ago. Rash is gradual onset and rash seems to be improving. He describes rash as itching and red. Patient denies difficulty breathing or throat/tongue swelling. Patient has tried Neosporin with no relief of symptoms. Patient has not had contact exposures to new laundry detergents, soaps, lotions, or other potential irritants. Denies new foods or medications.  Patient denies previous history of a similar rash. No one around them has had a similar rash.     PMH:   Past Medical History:   Diagnosis Date     Anxiety      Chronic infection     Lymes disease 2011, 1997     Depressive disorder      Lyme disease 2012    hearing loss right ear     Onychomycosis     left toe - treated with PO Lamisil     Toe fracture, right 2019    podagra     Allergies: Aspirin [dihydroxyaluminum aminoacetate]   Medications:   Current Outpatient Medications   Medication Sig Dispense Refill     escitalopram (LEXAPRO) 20 MG tablet Take 1 tablet (20 mg) by mouth daily Take with 10 mg tablet 90 tablet 3     meloxicam (MOBIC) 7.5 MG tablet Take 7.5 mg by mouth daily       mupirocin (BACTROBAN) 2 % external ointment Apply topically 3 times daily for 7 days 15 g 0     propranolol (INDERAL) 10 MG tablet Take 2 tablets by mouth 3 times daily       triamcinolone (KENALOG) 0.1 % external cream Apply topically 2 times daily for 14 days 30 g 0     buPROPion (WELLBUTRIN XL) 150 MG 24 hr tablet Take 1 tablet (150 mg) by mouth every morning (Patient not taking: Reported on 7/20/2023) 90 tablet 3     escitalopram (LEXAPRO) 10 MG tablet Take 1 tablet (10 mg) by mouth daily Take with 20 mg tablet (Patient not taking: Reported on 7/20/2023) 90 tablet 3     Social History:   Social History     Socioeconomic History     Marital status:      Spouse name: Not on file     Number of children: Not on file     Years of  education: Not on file     Highest education level: Not on file   Occupational History     Employer: [employer]     Comment:    Tobacco Use     Smoking status: Every Day     Packs/day: 0.50     Years: 20.00     Pack years: 10.00     Types: Cigarettes     Last attempt to quit: 6/3/2010     Years since quittin.1     Smokeless tobacco: Former     Types: Chew   Substance and Sexual Activity     Alcohol use: Yes     Alcohol/week: 4.0 standard drinks of alcohol     Types: 4 Standard drinks or equivalent per week     Drug use: No     Sexual activity: Yes     Partners: Female     Birth control/protection: Surgical     Comment:    Other Topics Concern     Not on file   Social History Narrative     Not on file     Social Determinants of Health     Financial Resource Strain: Not on file   Food Insecurity: Not on file   Transportation Needs: Not on file   Physical Activity: Not on file   Stress: Not on file   Social Connections: Not on file   Intimate Partner Violence: Not on file   Housing Stability: Not on file       ROS: ROS otherwise found to be negative except as noted above.    OBJECTIVE:  /88 (BP Location: Right arm, Patient Position: Sitting, Cuff Size: Adult Regular)   Pulse 56   Temp 98.4  F (36.9  C) (Oral)   Wt 88 kg (194 lb)   SpO2 98%   BMI 30.38 kg/m    General: WDWN in NAD.   Eyes: Non-injected conjunctivas without drainage bilaterally.  Cardiac: RRR without murmurs, rubs, or gallops.  Respiratory: LCTAB without adventitious sounds. Non-labored breathing.  Integumentary:   Distribution: localized  Location: left forearm    Color: red,  Lesion type: maculopapular, blotchy with warmth  Neuro: Alert and oriented.         ASSESSMENT:     ICD-10-CM    1. Dermatitis  L30.9 triamcinolone (KENALOG) 0.1 % external cream     mupirocin (BACTROBAN) 2 % external ointment           PLAN:  Patient Instructions   Patient was educated on the natural course of rash.  Unclear etiology.  DDx includes folliculitis, contact dermatitis to neosporin, inflammatory, amongst others. Take medications as prescribed. Conservative measures discussed including cold compresses. See your primary care provider if symptoms worsen or do not improve in 2 weeks. Seek emergency care if you develop severe pain/redness, shortness of breath, or difficulty swallowing.  Patient verbalized understanding and is agreeable to plan. The patient was discharged ambulatory and in stable condition.    Meagan Aviles PA-C on 7/20/2023 at 3:31 PM

## 2023-07-20 NOTE — PATIENT INSTRUCTIONS
Patient was educated on the natural course of rash.  Take medications as prescribed. Conservative measures discussed including cold compresses. See your primary care provider if symptoms worsen or do not improve in 2 weeks. Seek emergency care if you develop severe pain/redness, shortness of breath, or difficulty swallowing.

## 2023-07-26 ENCOUNTER — TRANSFERRED RECORDS (OUTPATIENT)
Dept: FAMILY MEDICINE | Facility: CLINIC | Age: 53
End: 2023-07-26

## 2023-08-08 ENCOUNTER — TRANSFERRED RECORDS (OUTPATIENT)
Dept: FAMILY MEDICINE | Facility: CLINIC | Age: 53
End: 2023-08-08

## 2023-08-09 ENCOUNTER — TELEPHONE (OUTPATIENT)
Dept: FAMILY MEDICINE | Facility: CLINIC | Age: 53
End: 2023-08-09

## 2023-08-14 ENCOUNTER — TRANSFERRED RECORDS (OUTPATIENT)
Dept: FAMILY MEDICINE | Facility: CLINIC | Age: 53
End: 2023-08-14

## 2023-08-23 ENCOUNTER — TRANSFERRED RECORDS (OUTPATIENT)
Dept: FAMILY MEDICINE | Facility: CLINIC | Age: 53
End: 2023-08-23

## 2023-10-03 ENCOUNTER — TRANSFERRED RECORDS (OUTPATIENT)
Dept: FAMILY MEDICINE | Facility: CLINIC | Age: 53
End: 2023-10-03

## 2023-10-16 ENCOUNTER — OFFICE VISIT (OUTPATIENT)
Dept: FAMILY MEDICINE | Facility: CLINIC | Age: 53
End: 2023-10-16

## 2023-10-16 VITALS
SYSTOLIC BLOOD PRESSURE: 108 MMHG | OXYGEN SATURATION: 96 % | DIASTOLIC BLOOD PRESSURE: 78 MMHG | RESPIRATION RATE: 20 BRPM | HEIGHT: 67 IN | TEMPERATURE: 98 F | WEIGHT: 195 LBS | BODY MASS INDEX: 30.61 KG/M2 | HEART RATE: 60 BPM

## 2023-10-16 DIAGNOSIS — F43.10 PTSD (POST-TRAUMATIC STRESS DISORDER): ICD-10-CM

## 2023-10-16 DIAGNOSIS — M16.12 OSTEOARTHRITIS OF LEFT HIP, UNSPECIFIED OSTEOARTHRITIS TYPE: ICD-10-CM

## 2023-10-16 DIAGNOSIS — Z71.6 ENCOUNTER FOR SMOKING CESSATION COUNSELING: ICD-10-CM

## 2023-10-16 DIAGNOSIS — F33.1 MAJOR DEPRESSIVE DISORDER, RECURRENT EPISODE, MODERATE (H): ICD-10-CM

## 2023-10-16 DIAGNOSIS — Z01.818 PRE-OPERATIVE GENERAL PHYSICAL EXAMINATION: Primary | ICD-10-CM

## 2023-10-16 LAB
% GRANULOCYTES: 57 %
BUN SERPL-MCNC: 15 MG/DL (ref 7–25)
BUN/CREATININE RATIO: 11.9 (ref 6–32)
CALCIUM SERPL-MCNC: 9.4 MG/DL (ref 8.6–10.3)
CHLORIDE SERPLBLD-SCNC: 104.4 MMOL/L (ref 98–110)
CO2 SERPL-SCNC: 31.6 MMOL/L (ref 20–32)
CREAT SERPL-MCNC: 1.26 MG/DL (ref 0.6–1.3)
GLUCOSE SERPL-MCNC: 93 MG/DL (ref 60–99)
HCT VFR BLD AUTO: 49.2 % (ref 40–53)
HEMOGLOBIN: 15.8 G/DL (ref 13.3–17.7)
LYMPHOCYTES NFR BLD AUTO: 35.2 %
MCH RBC QN AUTO: 31.6 PG (ref 26–33)
MCHC RBC AUTO-ENTMCNC: 32.1 G/DL (ref 31–36)
MCV RBC AUTO: 98.4 FL (ref 78–100)
MONOCYTES NFR BLD AUTO: 7.8 %
PLATELET COUNT - QUEST: 213 10^9/L (ref 150–375)
POTASSIUM SERPL-SCNC: 4.89 MMOL/L (ref 3.5–5.3)
RBC # BLD AUTO: 5 10*12/L (ref 4.4–5.9)
SODIUM SERPL-SCNC: 141.1 MMOL/L (ref 135–146)
WBC # BLD AUTO: 5.1 10*9/L (ref 4–11)

## 2023-10-16 PROCEDURE — 85025 COMPLETE CBC W/AUTO DIFF WBC: CPT | Performed by: STUDENT IN AN ORGANIZED HEALTH CARE EDUCATION/TRAINING PROGRAM

## 2023-10-16 PROCEDURE — 99214 OFFICE O/P EST MOD 30 MIN: CPT | Performed by: STUDENT IN AN ORGANIZED HEALTH CARE EDUCATION/TRAINING PROGRAM

## 2023-10-16 PROCEDURE — 80048 BASIC METABOLIC PNL TOTAL CA: CPT | Performed by: STUDENT IN AN ORGANIZED HEALTH CARE EDUCATION/TRAINING PROGRAM

## 2023-10-16 PROCEDURE — 36415 COLL VENOUS BLD VENIPUNCTURE: CPT | Performed by: STUDENT IN AN ORGANIZED HEALTH CARE EDUCATION/TRAINING PROGRAM

## 2023-10-16 RX ORDER — LAMOTRIGINE 100 MG/1
TABLET ORAL
COMMUNITY
Start: 2023-09-30

## 2023-10-16 NOTE — NURSING NOTE
Chief Complaint   Patient presents with    Pre-Op Exam     Left hip replacement being done by Dr. Ortiz with [facility], surgery being held at [facility] [place]

## 2023-10-16 NOTE — PROGRESS NOTES
[facility]  [address] [place] [address]  [address]  [facility] [address]  Phone: [phone]  Fax: [phone]  Primary Provider: Charles Pimentel  Pre-op Performing Provider: MAGGY ROMERO    PREOPERATIVE EVALUATION:  Today's date: 10/16/2023    Ishmael is a 52 year old male who presents for a preoperative evaluation.    Surgical Information:  Surgery/Procedure: Left hip replacement   [facility]  Surgeon: Dr. Ortiz  Surgery Date: 10/30/23  Time of Surgery: TBD  Where patient plans to recover: At home with family  Fax number for surgical facility: [phone]    Assessment & Plan     The proposed surgical procedure is considered INTERMEDIATE risk.      ICD-10-CM    1. Pre-operative general physical examination  Z01.818       2. Osteoarthritis of left hip, unspecified osteoarthritis type  M16.12 HEMOGRAM PLATELET DIFF (BFP)     Basic Metabolic Panel (BFP)      3. Major depressive disorder, recurrent episode, moderate (H)  F33.1 HEMOGRAM PLATELET DIFF (BFP)     Basic Metabolic Panel (BFP)      4. PTSD (post-traumatic stress disorder)  F43.10    hx of significant anxiety postoperatively   5. Encounter for smoking cessation counseling  Z71.6    Working to quit with nicotine replacement        Risks and Recommendations:  The patient has the following additional risks and recommendations for perioperative complications:   - No identified additional risk factors other than previously addressed    Antiplatelet or Anticoagulation Medication Instructions:   - Patient is on no antiplatelet or anticoagulation medications.    Additional Medication Instructions:  Patient is to take all scheduled medications on the day of surgery    RECOMMENDATION:  APPROVAL GIVEN to proceed with proposed procedure, without further diagnostic evaluation.      Maggy Romero MD, [facility]       Subjective     HPI related to upcoming procedure: chronic left hip  pain with planned RADHA. Also had recent lumbar ablation. Otherwise in usual state of health, mental health stable    1. No - Have you ever had a heart attack or stroke?  2. No - Have you ever had surgery on your heart or blood vessels, such as a stent, coronary (heart) bypass, or surgery on an artery in the head, neck, heart, or legs?  3. No - Do you have chest pain when you are physically active?  4. No - Do you have a history of heart failure?  5. No - Do you currently have a cold, bronchitis, or symptoms of other respiratory (head and chest) infections?  6. No - Do you have a cough, shortness of breath, or wheezing?  7. No - Do you or anyone in your family have a history of blood clots?  8. No - Do you or anyone in your family have a serious bleeding problem, such as long-lasting bleeding after surgeries or cuts?  9. No - Have you ever had anemia or been told to take iron pills?  10. No - Have you had any abnormal blood loss such as black, tarry or bloody stools, or abnormal vaginal bleeding?  11. No - Have you ever had a blood transfusion?  12. Yes - Are you willing to have a blood transfusion if it is medically needed before, during, or after your surgery?  13. No - Have you or anyone in your family ever had problems with anesthesia (sedation for surgery)? Hx of significant anxiety postop  14. Yes - Do you have sleep apnea, excessive snoring, or daytime drowsiness? Hx of upper airway obstruction, uses CPAP   15. No - Do you have any artifical heart valves or other implanted medical devices, such as a pacemaker, defibrillator, or continuous glucose monitor?  16. No - Do you have any artifical joints?  17. No - Are you allergic to latex?      Health Care Directive:  Patient does not have a Health Care Directive or Living Will: Discussed advance care planning with patient; information given to patient to review.    Status of Chronic Conditions:  See problem list for active medical problems.  Problems all  longstanding and stable, except as noted/documented.  See ROS for pertinent symptoms related to these conditions.    Review of Systems  12 point ROS performed and negative for new concerns except as mentioned above     Patient Active Problem List    Diagnosis Date Noted    Fatty liver 02/02/2022     Priority: Medium    Osteoarthritis of both feet, unspecified osteoarthritis type 09/12/2019     Priority: Medium    Asymptomatic varicose veins of right lower extremity 09/12/2019     Priority: Medium    Rectal bleeding 09/12/2019     Priority: Medium    Oral herpes simplex infection 09/12/2019     Priority: Medium    Encounter for  health examination 09/09/2019     Priority: Medium    Obesity (BMI 30-39.9) 07/30/2018     Priority: Medium    Personal history of tobacco use, presenting hazards to health -chewing 07/30/2018     Priority: Medium    Skin neoplasm - upper left back 3 mm erythematous macule 2017 03/02/2017     Priority: Medium    DANY (obstructive sleep apnea) 08/12/2014     Priority: Medium     2011 sleep study at [facility]  Per medical records transferred      Health Care Home 06/03/2014     Priority: Medium     State Tier Level:  Tier 1  Status:  NA  Care Coordinator:      See Letters for [facility] Care Plan  Date:  August 13, 2014          ACP (advance care planning) 06/03/2014     Priority: Medium     Advance Care Planning:   ACP Review and Resources Provided:  Reviewed chart for advance care plan.  Andrey Parks has no plan or code status on file. Discussed available resources and provided with information. Confirmed code status reflects current choices pending further ACP discussions.  Confirmed/documented designated decision maker(s). See permanent comments section of demographics in clinical tab.   Added by Abby Parra on 6/3/2014            Major depressive disorder, recurrent episode, moderate (H) 03/20/2008     Priority: Medium     Overview:   Rule out PTSD and Adult ADHD which are likely  comorbid problems.        Past Medical History:   Diagnosis Date    Anxiety     Chronic infection     Lymes disease ,     Depressive disorder     Lyme disease     hearing loss right ear    Onychomycosis     left toe - treated with PO Lamisil    Toe fracture, right 2019    podagra     Past Surgical History:   Procedure Laterality Date    ARTHROSCOPY KNEE      right knee    COLONOSCOPY  2013    normal    ORTHOPEDIC SURGERY Right 1988    right ankle surgery    refractive surgery  2010    Lasix    SEPTOPLASTY, TURBINOPLASTY, COMBINED  2011    Procedure:COMBINED SEPTOPLASTY, TURBINOPLASTY; COMBINED SEPTOPLASTY  with Turbinate Reduction ; Surgeon:JACQUELINE KIRAN; Location:[facility] OR    TONSILLECTOMY      childhood     Current Outpatient Medications   Medication Sig Dispense Refill    escitalopram (LEXAPRO) 20 MG tablet Take 1 tablet (20 mg) by mouth daily Take with 10 mg tablet 90 tablet 3    lamoTRIgine (LAMICTAL) 100 MG tablet       propranolol (INDERAL) 10 MG tablet Take 2 tablets by mouth 3 times daily         Allergies   Allergen Reactions    Aspirin [Dihydroxyaluminum Aminoacetate] Rash        Social History     Tobacco Use    Smoking status: Every Day     Packs/day: 0.50     Years: 20.00     Additional pack years: 0.00     Total pack years: 10.00     Types: Cigarettes     Last attempt to quit: 6/3/2010     Years since quittin.3    Smokeless tobacco: Former     Types: Chew    Tobacco comments:     Cutting down, using nicorette gum, 1-2 cig/day now   Substance Use Topics    Alcohol use: Not Currently     Family History   Problem Relation Age of Onset    Cancer Mother 70        possible colon or ovarian that metastasized    Alcohol/Drug Mother     Depression Mother     Alcohol/Drug Father     Seizure Disorder Father     No Known Problems Sister     No Known Problems Brother     Depression Brother     Other - See Comments Brother         ADHD    Depression Brother     Other - See Comments Paternal  "Grandmother         stroke    Cancer Paternal Grandfather         prostate    Other - See Comments Son         ? learning disability    Other - See Comments Son         autism spectrum    Depression Son     Anesthesia Reaction No family hx of     Bleeding Disorder No family hx of     Deep Vein Thrombosis (DVT) No family hx of      History   Drug Use No         Objective     /78 (BP Location: Right arm, Patient Position: Sitting, Cuff Size: Adult Large)   Pulse 60   Temp 98  F (36.7  C) (Temporal)   Resp 20   Ht 1.702 m (5' 7\")   Wt 88.5 kg (195 lb)   SpO2 96%   BMI 30.54 kg/m      Physical Exam  GENERAL APPEARANCE: healthy, alert and no distress  EYES: Eyes grossly normal to inspection, PERRL, and conjunctivae and sclerae normal  HENT: ear canals and TM's normal and nose and mouth without ulcers or lesions  NECK: no adenopathy, no asymmetry, masses, or scars, and thyroid normal to palpation  RESP: lungs clear to auscultation - no rales, rhonchi or wheezes  CV: regular rate and rhythm, normal S1 S2, no S3 or S4 and no murmur, click or rub   ABDOMEN: soft, nontender, no HSM or masses and bowel sounds normal  NEURO: Normal strength and tone, sensory exam grossly normal, mentation intact and speech normal  PSYCH: mentation appears normal and affect normal/bright    Recent Labs   Lab Test 04/13/23  1032 04/13/23  0000 02/01/22  0000   HGB  --  15.8 16.6   PLT  --  208 219   NA  --  139.7  --    POTASSIUM  --  4.33  --    CR  --  1.05  --    A1C 5.2  --   --         Diagnostics:    Results for orders placed or performed in visit on 10/16/23   HEMOGRAM PLATELET DIFF (BFP)     Status: None   Result Value Ref Range    WBC 5.1 4.0 - 11 10*9/L    RBC Count 5.00 4.4 - 5.9 10*12/L    Hemoglobin 15.8 13.3 - 17.7 g/dL    Hematocrit 49.2 40.0 - 53.0 %    MCV 98.4 78 - 100 fL    MCH 31.6 26 - 33 pg    MCHC 32.1 31 - 36 g/dL    Platelet Count 213 150 - 375 10^9/L    % Granulocytes 57.0 %    % Lymphocytes 35.2 %    % " Monocytes 7.8 %   Basic Metabolic Panel (BFP)     Status: None   Result Value Ref Range    Carbon Dioxide 31.6 20 - 32 mmol/L    Creatinine 1.26 0.60 - 1.30 mg/dL    Glucose 93 60 - 99 mg/dL    Sodium 141.1 135 - 146 mmol/L    Potassium 4.89 3.5 - 5.3 mmol/L    Chloride 104.4 98 - 110 mmol/L    Urea Nitrogen 15 7 - 25 mg/dL    Calcium 9.4 8.6 - 10.3 mg/dL    BUN/Creatinine Ratio 11.9 6 - 32       No EKG required, no history of coronary heart disease, significant arrhythmia, peripheral arterial disease or other structural heart disease. EKG from April 2023 reviewed, NSR.     Revised Cardiac Risk Index (RCRI):  The patient has the following serious cardiovascular risks for perioperative complications:   - No serious cardiac risks = 0 points     RCRI Interpretation: 0 points: Class I (very low risk - 0.4% complication rate)         Signed Electronically by: MAGGY ROMERO MD  Copy of this evaluation report is provided to requesting physician.

## 2023-10-31 ENCOUNTER — TRANSFERRED RECORDS (OUTPATIENT)
Dept: FAMILY MEDICINE | Facility: CLINIC | Age: 53
End: 2023-10-31

## 2023-12-18 ENCOUNTER — TRANSFERRED RECORDS (OUTPATIENT)
Dept: FAMILY MEDICINE | Facility: CLINIC | Age: 53
End: 2023-12-18

## 2024-06-23 ENCOUNTER — HEALTH MAINTENANCE LETTER (OUTPATIENT)
Age: 54
End: 2024-06-23

## 2024-07-10 ENCOUNTER — TRANSFERRED RECORDS (OUTPATIENT)
Dept: FAMILY MEDICINE | Facility: CLINIC | Age: 54
End: 2024-07-10

## 2024-07-17 ENCOUNTER — TRANSFERRED RECORDS (OUTPATIENT)
Dept: FAMILY MEDICINE | Facility: CLINIC | Age: 54
End: 2024-07-17

## 2024-07-31 ENCOUNTER — TRANSFERRED RECORDS (OUTPATIENT)
Dept: FAMILY MEDICINE | Facility: CLINIC | Age: 54
End: 2024-07-31

## 2024-08-14 ENCOUNTER — TELEPHONE (OUTPATIENT)
Dept: FAMILY MEDICINE | Facility: CLINIC | Age: 54
End: 2024-08-14

## 2024-08-14 NOTE — TELEPHONE ENCOUNTER
Records printed, invoice in the amount of $50.00 faxed to Mirlande Cornejo , waiting on payment to release records.

## 2024-08-14 NOTE — TELEPHONE ENCOUNTER
Emailed records per request, payment will be made from [facility] once records are received.    Yes

## 2024-08-15 ENCOUNTER — TELEPHONE (OUTPATIENT)
Dept: FAMILY MEDICINE | Facility: CLINIC | Age: 54
End: 2024-08-15

## 2024-08-19 ENCOUNTER — TELEPHONE (OUTPATIENT)
Dept: FAMILY MEDICINE | Facility: CLINIC | Age: 54
End: 2024-08-19

## 2024-08-28 ENCOUNTER — OFFICE VISIT (OUTPATIENT)
Dept: FAMILY MEDICINE | Facility: CLINIC | Age: 54
End: 2024-08-28

## 2024-08-28 VITALS
WEIGHT: 200 LBS | OXYGEN SATURATION: 98 % | DIASTOLIC BLOOD PRESSURE: 76 MMHG | SYSTOLIC BLOOD PRESSURE: 126 MMHG | BODY MASS INDEX: 31.39 KG/M2 | HEIGHT: 67 IN | HEART RATE: 58 BPM | TEMPERATURE: 98 F

## 2024-08-28 DIAGNOSIS — Z13.220 SCREENING FOR LIPOID DISORDERS: ICD-10-CM

## 2024-08-28 DIAGNOSIS — Z13.228 SCREENING FOR ENDOCRINE, METABOLIC AND IMMUNITY DISORDER: ICD-10-CM

## 2024-08-28 DIAGNOSIS — Z02.89 ENCOUNTER FOR FIREFIGHTER HEALTH EXAMINATION: ICD-10-CM

## 2024-08-28 DIAGNOSIS — Z00.00 ROUTINE GENERAL MEDICAL EXAMINATION AT A HEALTH CARE FACILITY: Primary | ICD-10-CM

## 2024-08-28 DIAGNOSIS — Z13.0 SCREENING FOR ENDOCRINE, METABOLIC AND IMMUNITY DISORDER: ICD-10-CM

## 2024-08-28 DIAGNOSIS — Z12.5 SCREENING FOR MALIGNANT NEOPLASM OF PROSTATE: ICD-10-CM

## 2024-08-28 DIAGNOSIS — Z13.29 SCREENING FOR ENDOCRINE, METABOLIC AND IMMUNITY DISORDER: ICD-10-CM

## 2024-08-28 LAB
% GRANULOCYTES: 54.4 %
APPEARANCE UR: CLEAR
BACTERIA, UR: NORMAL
BILIRUB UR QL: NORMAL
BUN SERPL-MCNC: 14 MG/DL (ref 7–25)
BUN/CREATININE RATIO: 12 (ref 6–32)
CALCIUM SERPL-MCNC: 9.8 MG/DL (ref 8.6–10.3)
CASTS/LPF: NORMAL
CHLORIDE SERPLBLD-SCNC: 105.7 MMOL/L (ref 98–110)
CHOLEST SERPL-MCNC: 210 MG/DL (ref 0–199)
CHOLEST/HDLC SERPL: 4 {RATIO} (ref 0–5)
CO2 SERPL-SCNC: 26.5 MMOL/L (ref 20–32)
COLOR UR: YELLOW
CREAT SERPL-MCNC: 1.19 MG/DL (ref 0.6–1.3)
EP/HPF: NORMAL
GLUCOSE SERPL-MCNC: 88 MG/DL (ref 60–99)
GLUCOSE URINE: NORMAL MG/DL
HCT VFR BLD AUTO: 48.2 % (ref 40–53)
HDLC SERPL-MCNC: 57 MG/DL (ref 40–150)
HEMOGLOBIN A1C: 5.2 % (ref 4–5.6)
HEMOGLOBIN: 16.1 G/DL (ref 13.3–17.7)
HGB UR QL: NORMAL
KETONES UR QL: NORMAL MG/DL
LDLC SERPL CALC-MCNC: 131 MG/DL
LYMPHOCYTES NFR BLD AUTO: 38.3 %
MCH RBC QN AUTO: 33.1 PG (ref 26–33)
MCHC RBC AUTO-ENTMCNC: 33.4 G/DL (ref 31–36)
MCV RBC AUTO: 99.2 FL (ref 78–100)
MISC.: NORMAL
MONOCYTES NFR BLD AUTO: 7.3 %
NITRITE UR QL STRIP: NORMAL
PH UR STRIP: 6 PH (ref 5–7)
PLATELET COUNT - QUEST: 190 10^9/L (ref 150–375)
POTASSIUM SERPL-SCNC: 4.25 MMOL/L (ref 3.5–5.3)
PROT UR QL: NORMAL MG/DL
RBC # BLD AUTO: 4.86 10*12/L (ref 4.4–5.9)
RBC, UR MICRO: NORMAL (ref ?–2)
SODIUM SERPL-SCNC: 141.5 MMOL/L (ref 135–146)
SP GR UR STRIP: 1.02
TRIGL SERPL-MCNC: 109 MG/DL (ref 0–149)
UROBILINOGEN UR QL STRIP: 0.2 EU/DL (ref 0.2–1)
WBC # BLD AUTO: 4.3 10*9/L (ref 4–11)
WBC #/AREA URNS HPF: NORMAL /[HPF]
WBC, UR MICRO: NORMAL (ref ?–2)

## 2024-08-28 PROCEDURE — 84443 ASSAY THYROID STIM HORMONE: CPT | Mod: 90 | Performed by: PHYSICIAN ASSISTANT

## 2024-08-28 PROCEDURE — 81001 URINALYSIS AUTO W/SCOPE: CPT | Performed by: PHYSICIAN ASSISTANT

## 2024-08-28 PROCEDURE — 80048 BASIC METABOLIC PNL TOTAL CA: CPT | Performed by: PHYSICIAN ASSISTANT

## 2024-08-28 PROCEDURE — 36415 COLL VENOUS BLD VENIPUNCTURE: CPT | Performed by: PHYSICIAN ASSISTANT

## 2024-08-28 PROCEDURE — 99396 PREV VISIT EST AGE 40-64: CPT | Performed by: PHYSICIAN ASSISTANT

## 2024-08-28 PROCEDURE — 83036 HEMOGLOBIN GLYCOSYLATED A1C: CPT | Performed by: PHYSICIAN ASSISTANT

## 2024-08-28 PROCEDURE — 93000 ELECTROCARDIOGRAM COMPLETE: CPT | Performed by: PHYSICIAN ASSISTANT

## 2024-08-28 PROCEDURE — 84153 ASSAY OF PSA TOTAL: CPT | Mod: 90 | Performed by: PHYSICIAN ASSISTANT

## 2024-08-28 PROCEDURE — 85025 COMPLETE CBC W/AUTO DIFF WBC: CPT | Performed by: PHYSICIAN ASSISTANT

## 2024-08-28 PROCEDURE — 80061 LIPID PANEL: CPT | Performed by: PHYSICIAN ASSISTANT

## 2024-08-28 ASSESSMENT — ANXIETY QUESTIONNAIRES
7. FEELING AFRAID AS IF SOMETHING AWFUL MIGHT HAPPEN: SEVERAL DAYS
3. WORRYING TOO MUCH ABOUT DIFFERENT THINGS: SEVERAL DAYS
GAD7 TOTAL SCORE: 7
5. BEING SO RESTLESS THAT IT IS HARD TO SIT STILL: MORE THAN HALF THE DAYS
IF YOU CHECKED OFF ANY PROBLEMS ON THIS QUESTIONNAIRE, HOW DIFFICULT HAVE THESE PROBLEMS MADE IT FOR YOU TO DO YOUR WORK, TAKE CARE OF THINGS AT HOME, OR GET ALONG WITH OTHER PEOPLE: SOMEWHAT DIFFICULT
2. NOT BEING ABLE TO STOP OR CONTROL WORRYING: NOT AT ALL
1. FEELING NERVOUS, ANXIOUS, OR ON EDGE: SEVERAL DAYS
6. BECOMING EASILY ANNOYED OR IRRITABLE: SEVERAL DAYS
GAD7 TOTAL SCORE: 7

## 2024-08-28 ASSESSMENT — PATIENT HEALTH QUESTIONNAIRE - PHQ9
5. POOR APPETITE OR OVEREATING: SEVERAL DAYS
SUM OF ALL RESPONSES TO PHQ QUESTIONS 1-9: 8

## 2024-08-28 NOTE — NURSING NOTE
Chief Complaint   Patient presents with    Physical     Pt is here for a fasting physical      Pre-visit Screening:  Immunizations:  up to date  Colonoscopy:  is up to date  Mammogram: na  Asthma Action Test/Plan:  na  PHQ9:  given today   GAD7:  given today   Questioned patient about current smoking habits Pt. Pt quit smoking 6+ months ago but still chews tobacco   Ok to leave detailed message on voice mail for today's visit only yes , phone # [phone] (home)

## 2024-08-28 NOTE — PROGRESS NOTES
Preventive Care Visit  [facility] PHYSIC[facility]ANS, P.KAUR Pimentel PA-C, Family Medicine  Aug 28, 2024      SUBJECTIVE:   Ishmael is a 53 year old, presenting for the following:  Physical (Pt is here for a fasting physical )      HPI    Diet - good, mostly plant-based  Exercise - 3-4x/week, cardio and strength  Sleep - harder to fall asleep, sleep quality is better if wife is there, using CPAP and getting reevaluation soon  BM- taking metamucil every day, 1x/day  Vitamin Use - B12 occasionally  Dentist - saw after last visit, trying to see 1x/year  Eye Doctor - 1x/year  Colon - due 2027  PSA - ordered today    Needs  exam-extra tests ordered for this (TSH, CBC, A1c, EKG). Considering lung cancer screening-will call for orders. 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 Smoking on/off for approx 34 years, chart updated.     Moving to [place] tomorrow-will still spend some time in [place].     Social History     Tobacco Use    Smoking status: Some Days     Current packs/day: 0.50     Average packs/day: 0.5 packs/day for 34.2 years (17.1 ttl pk-yrs)     Types: Cigarettes     Start date: 6/3/1990    Smokeless tobacco: Current     Types: Chew    Tobacco comments:     Cutting down, using nicorette gum, 1-2 cig/day now   Substance Use Topics    Alcohol use: Yes     Alcohol/week: 2.0 standard drinks of alcohol     Types: 2 Standard drinks or equivalent per week             12/17/2020    12:54 PM 4/13/2023    10:56 AM 8/28/2024    11:32 AM   PHQ   PHQ-9 Total Score 1 15 8   Q9: Thoughts of better off dead/self-harm past 2 weeks Not at all Several days Not at all           12/17/2020    12:54 PM 4/13/2023    10:56 AM 8/28/2024    11:32 AM   BARBARA-7 SCORE   Total Score 6 9 7         Last PSA:   [facility] PSA   Date Value Ref Range Status   04/13/2023 0.52 < OR = 4.00 ng/mL Final     Comment:     The total PSA value from this assay system is   standardized against the WHO standard. The test   result will be approximately 20%  lower when compared   to the equimolar-standardized total PSA (Andrea   Cochise). Comparison of serial PSA results should be   interpreted with this fact in mind.     This test was performed using the Siemens   chemiluminescent method. Values obtained from   different assay methods cannot be used  interchangeably. PSA levels, regardless of  value, should not be interpreted as absolute  evidence of the presence or absence of disease.         Reviewed orders with patient. Reviewed health maintenance and updated orders accordingly - Yes      Reviewed and updated as needed this visit by clinical staff   Tobacco  Allergies    Med Hx  Surg Hx  Fam Hx  Soc Hx        Reviewed and updated as needed this visit by Provider   Tobacco     Med Hx  Surg Hx  Fam Hx  Soc Hx Sexual Activity        Past Medical History:   Diagnosis Date    Anxiety     Chronic infection     Lymes disease 2011, 1997    Depressive disorder     Lyme disease 2012    hearing loss right ear    Onychomycosis     left toe - treated with PO Lamisil    PTSD (post-traumatic stress disorder)     Toe fracture, right 2019    podagra      Past Surgical History:   Procedure Laterality Date    ARTHROSCOPY KNEE      right knee    COLONOSCOPY  2013    normal    JOINT REPLACEMENT, HIP RT/LT Left 10/2023    ORTHOPEDIC SURGERY Right 1988    right ankle surgery    RADIOFREQUENCY ABLATION Left 12/2023    Left lumbar    refractive surgery  2010    Lasix    SEPTOPLASTY, TURBINOPLASTY, COMBINED  11/02/2011    Procedure:COMBINED SEPTOPLASTY, TURBINOPLASTY; COMBINED SEPTOPLASTY  with Turbinate Reduction ; Surgeon:JACQUELINE KIRAN; Location:[facility] OR    TONSILLECTOMY      childhood     Review of Systems  Constitutional, neuro, ENT, endocrine, pulmonary, cardiac, gastrointestinal, genitourinary, musculoskeletal, integument and psychiatric systems are negative, except as otherwise noted.    OBJECTIVE:   /76 (BP Location: Left arm, Patient Position: Sitting, Cuff Size:  "Adult Large)   Pulse 58   Temp 98  F (36.7  C) (Oral)   Ht 1.689 m (5' 6.5\")   Wt 90.7 kg (200 lb)   SpO2 98%   BMI 31.80 kg/m     Estimated body mass index is 31.8 kg/m  as calculated from the following:    Height as of this encounter: 1.689 m (5' 6.5\").    Weight as of this encounter: 90.7 kg (200 lb).  Physical Exam  GENERAL: alert and no distress  EYES: Eyes grossly normal to inspection, PERRL and conjunctivae and sclerae normal  HENT: ear canals and TM's normal, nose and mouth without ulcers or lesions  NECK: no adenopathy, no asymmetry, masses, or scars  RESP: lungs clear to auscultation - no rales, rhonchi or wheezes  CV: regular rate and rhythm, normal S1 S2, no S3 or S4, no murmur, click or rub, no peripheral edema  ABDOMEN: soft, nontender, no hepatosplenomegaly, no masses and bowel sounds normal  MS: no gross musculoskeletal defects noted, no edema  NEURO: Normal strength and tone, mentation intact and speech normal  PSYCH: mentation appears normal, affect normal/bright    Diagnostic Test Results:  Labs reviewed in Epic  Results for orders placed or performed in visit on 08/28/24   HEMOGLOBIN A1C (BFP)     Status: None   Result Value Ref Range    Hemoglobin A1C 5.2 4 - 5.6 %   HEMOGRAM PLATELET DIFF (BFP)     Status: Abnormal   Result Value Ref Range    WBC 4.3 4.0 - 11 10*9/L    RBC Count 4.86 4.4 - 5.9 10*12/L    Hemoglobin 16.1 13.3 - 17.7 g/dL    Hematocrit 48.2 40.0 - 53.0 %    MCV 99.2 78 - 100 fL    MCH 33.1 (A) 26 - 33 pg    MCHC 33.4 31 - 36 g/dL    Platelet Count 190 150 - 375 10^9/L    % Granulocytes 54.4 %    % Lymphocytes 38.3 %    % Monocytes 7.3 %   URINALYSIS, ROUTINE (BFP)     Status: None   Result Value Ref Range    Color Urine Yellow     Appearance Urine Clear     Glucose Urine Neg mg/dL    Bilirubin Urine Neg     Ketones Urine Neg mg/dL    Specific Gravity Urine 1.025     Blood Urine Neg     pH Urine 6.0 pH    Protein Urine neg neg - neg mg/dL    Urobilinogen Urine 0.2 EU/dL    " Nitrite Urine Neg     Leukocytes neg     Wbc, Urine Micro neg neg - 2    RBC Micro Urine neg neg - 2    EP/HPF neg     Bacteria Urine neg neg - neg    Casts/LPF neg     Miscellaneous neg      EKG - Reviewed and interpreted by me sinus bradycardia, normal axis, normal intervals, no acute ST/T changes c/w ischemia, no LVH by voltage criteria, occasional PVC noted, unifocal    ASSESSMENT/PLAN:   Routine general medical examination at a health care facility  - PSA Total ([facility])  - Lipid Panel (BFP)  - VENOUS COLLECTION  - Basic Metabolic Panel (BFP)  - EKG 12-lead complete w/read - Clinics  - TSH WITH FREE T4 REFLEX ([facility])  - HEMOGLOBIN A1C (BFP)  - HEMOGRAM PLATELET DIFF (BFP)  - URINALYSIS, ROUTINE (BFP)    Screening for lipoid disorders  - Lipid Panel (BFP)  - VENOUS COLLECTION    Screening for malignant neoplasm of prostate  - PSA Total ([facility])  - VENOUS COLLECTION    Screening for endocrine, metabolic and immunity disorder  - VENOUS COLLECTION  - Basic Metabolic Panel (BFP)    Encounter for  health examination  - EKG 12-lead complete w/read - Clinics  - TSH WITH FREE T4 REFLEX ([facility])  - HEMOGLOBIN A1C (BFP)  - HEMOGRAM PLATELET DIFF (BFP)  - URINALYSIS, ROUTINE (BFP)    Patient has been advised of split billing requirements and indicates understanding: Yes      Counseling  Reviewed preventive health counseling, as reflected in patient instructions       Regular exercise       Healthy diet/nutrition       Immunizations  Discussed getting Shingles vaccine due to age         Colorectal cancer screening       Prostate cancer screening       Consider lung cancer screening for ages 55-80 years (77 for Medicare) and 20 pack-year smoking history  due to increased risk as       He reports that he has been smoking cigarettes. He started smoking about 34 years ago. He has a 17.1 pack-year smoking history. His smokeless tobacco use includes chew.              Signed Electronically by: Charles Pimentel,  JONEL

## 2024-08-29 LAB
ABBOTT PSA - QUEST: 0.45 NG/ML
TSH SERPL-ACNC: 2.95 MIU/L (ref 0.4–4.5)

## 2024-10-11 ENCOUNTER — TRANSFERRED RECORDS (OUTPATIENT)
Dept: FAMILY MEDICINE | Facility: CLINIC | Age: 54
End: 2024-10-11